# Patient Record
Sex: FEMALE | Race: ASIAN | NOT HISPANIC OR LATINO
[De-identification: names, ages, dates, MRNs, and addresses within clinical notes are randomized per-mention and may not be internally consistent; named-entity substitution may affect disease eponyms.]

---

## 2017-12-19 ENCOUNTER — APPOINTMENT (OUTPATIENT)
Dept: RADIOLOGY | Facility: HOSPITAL | Age: 42
End: 2017-12-19

## 2017-12-19 ENCOUNTER — OUTPATIENT (OUTPATIENT)
Dept: OUTPATIENT SERVICES | Facility: HOSPITAL | Age: 42
LOS: 1 days | End: 2017-12-19
Payer: COMMERCIAL

## 2017-12-19 DIAGNOSIS — M25.519 PAIN IN UNSPECIFIED SHOULDER: ICD-10-CM

## 2017-12-19 PROCEDURE — 73030 X-RAY EXAM OF SHOULDER: CPT | Mod: 26,RT

## 2019-03-27 ENCOUNTER — APPOINTMENT (OUTPATIENT)
Dept: MAMMOGRAPHY | Facility: IMAGING CENTER | Age: 44
End: 2019-03-27
Payer: COMMERCIAL

## 2019-03-27 ENCOUNTER — OUTPATIENT (OUTPATIENT)
Dept: OUTPATIENT SERVICES | Facility: HOSPITAL | Age: 44
LOS: 1 days | End: 2019-03-27
Payer: COMMERCIAL

## 2019-03-27 DIAGNOSIS — Z00.8 ENCOUNTER FOR OTHER GENERAL EXAMINATION: ICD-10-CM

## 2019-03-27 PROCEDURE — 77067 SCR MAMMO BI INCL CAD: CPT | Mod: 26

## 2019-03-27 PROCEDURE — 77063 BREAST TOMOSYNTHESIS BI: CPT | Mod: 26

## 2019-03-27 PROCEDURE — 77067 SCR MAMMO BI INCL CAD: CPT

## 2019-03-27 PROCEDURE — 77063 BREAST TOMOSYNTHESIS BI: CPT

## 2019-07-23 ENCOUNTER — APPOINTMENT (OUTPATIENT)
Dept: DERMATOLOGY | Facility: CLINIC | Age: 44
End: 2019-07-23
Payer: COMMERCIAL

## 2019-07-23 VITALS — SYSTOLIC BLOOD PRESSURE: 130 MMHG | DIASTOLIC BLOOD PRESSURE: 94 MMHG

## 2019-07-23 DIAGNOSIS — L81.0 POSTINFLAMMATORY HYPERPIGMENTATION: ICD-10-CM

## 2019-07-23 DIAGNOSIS — L56.4 POLYMORPHOUS LIGHT ERUPTION: ICD-10-CM

## 2019-07-23 PROCEDURE — 99203 OFFICE O/P NEW LOW 30 MIN: CPT

## 2019-07-23 RX ORDER — AZITHROMYCIN 500 MG/1
500 TABLET, FILM COATED ORAL
Qty: 3 | Refills: 0 | Status: ACTIVE | COMMUNITY
Start: 2019-02-05

## 2019-07-23 RX ORDER — LIDOCAINE 5% 700 MG/1
5 PATCH TOPICAL
Qty: 30 | Refills: 0 | Status: ACTIVE | COMMUNITY
Start: 2019-06-09

## 2019-07-23 RX ORDER — SIMVASTATIN 20 MG/1
20 TABLET, FILM COATED ORAL
Qty: 90 | Refills: 0 | Status: ACTIVE | COMMUNITY
Start: 2019-06-08

## 2019-07-23 RX ORDER — BETAMETHASONE DIPROPIONATE 0.5 MG/G
0.05 OINTMENT TOPICAL
Qty: 1 | Refills: 0 | Status: ACTIVE | COMMUNITY
Start: 2019-07-23 | End: 1900-01-01

## 2019-11-11 ENCOUNTER — INPATIENT (INPATIENT)
Facility: HOSPITAL | Age: 44
LOS: 6 days | Discharge: HOME | DRG: 854 | End: 2019-11-18
Attending: OBSTETRICS & GYNECOLOGY | Admitting: OBSTETRICS & GYNECOLOGY
Payer: COMMERCIAL

## 2019-11-11 VITALS
WEIGHT: 184.97 LBS | TEMPERATURE: 103 F | DIASTOLIC BLOOD PRESSURE: 78 MMHG | RESPIRATION RATE: 20 BRPM | SYSTOLIC BLOOD PRESSURE: 142 MMHG | HEIGHT: 61 IN | OXYGEN SATURATION: 100 % | HEART RATE: 120 BPM

## 2019-11-11 LAB
ALBUMIN SERPL ELPH-MCNC: 3.9 G/DL — SIGNIFICANT CHANGE UP (ref 3.3–5)
ALP SERPL-CCNC: 74 U/L — SIGNIFICANT CHANGE UP (ref 40–120)
ALT FLD-CCNC: 15 U/L — SIGNIFICANT CHANGE UP (ref 10–45)
ANION GAP SERPL CALC-SCNC: 13 MMOL/L — SIGNIFICANT CHANGE UP (ref 5–17)
APPEARANCE UR: CLEAR — SIGNIFICANT CHANGE UP
APTT BLD: 27.9 SEC — SIGNIFICANT CHANGE UP (ref 27.5–36.3)
AST SERPL-CCNC: 14 U/L — SIGNIFICANT CHANGE UP (ref 10–40)
BASOPHILS # BLD AUTO: 0 K/UL — SIGNIFICANT CHANGE UP (ref 0–0.2)
BASOPHILS NFR BLD AUTO: 0 % — SIGNIFICANT CHANGE UP (ref 0–2)
BILIRUB SERPL-MCNC: 0.2 MG/DL — SIGNIFICANT CHANGE UP (ref 0.2–1.2)
BILIRUB UR-MCNC: NEGATIVE — SIGNIFICANT CHANGE UP
BUN SERPL-MCNC: 13 MG/DL — SIGNIFICANT CHANGE UP (ref 7–23)
CALCIUM SERPL-MCNC: 9.5 MG/DL — SIGNIFICANT CHANGE UP (ref 8.4–10.5)
CHLORIDE SERPL-SCNC: 97 MMOL/L — SIGNIFICANT CHANGE UP (ref 96–108)
CO2 SERPL-SCNC: 23 MMOL/L — SIGNIFICANT CHANGE UP (ref 22–31)
COLOR SPEC: COLORLESS — SIGNIFICANT CHANGE UP
CREAT SERPL-MCNC: 0.61 MG/DL — SIGNIFICANT CHANGE UP (ref 0.5–1.3)
DIFF PNL FLD: NEGATIVE — SIGNIFICANT CHANGE UP
EOSINOPHIL # BLD AUTO: 0 K/UL — SIGNIFICANT CHANGE UP (ref 0–0.5)
EOSINOPHIL NFR BLD AUTO: 0 % — SIGNIFICANT CHANGE UP (ref 0–6)
GLUCOSE SERPL-MCNC: 120 MG/DL — HIGH (ref 70–99)
GLUCOSE UR QL: NEGATIVE — SIGNIFICANT CHANGE UP
HCG SERPL-ACNC: 0.5 MIU/ML — SIGNIFICANT CHANGE UP
HCT VFR BLD CALC: 30.5 % — LOW (ref 34.5–45)
HGB BLD-MCNC: 9.3 G/DL — LOW (ref 11.5–15.5)
INR BLD: 1.21 RATIO — HIGH (ref 0.88–1.16)
KETONES UR-MCNC: NEGATIVE — SIGNIFICANT CHANGE UP
LACTATE BLDV-MCNC: 1 MMOL/L — SIGNIFICANT CHANGE UP (ref 0.7–2)
LEUKOCYTE ESTERASE UR-ACNC: NEGATIVE — SIGNIFICANT CHANGE UP
LYMPHOCYTES # BLD AUTO: 14.9 % — SIGNIFICANT CHANGE UP (ref 13–44)
LYMPHOCYTES # BLD AUTO: 3.02 K/UL — SIGNIFICANT CHANGE UP (ref 1–3.3)
MCHC RBC-ENTMCNC: 20.9 PG — LOW (ref 27–34)
MCHC RBC-ENTMCNC: 30.5 GM/DL — LOW (ref 32–36)
MCV RBC AUTO: 68.5 FL — LOW (ref 80–100)
MONOCYTES # BLD AUTO: 0.89 K/UL — SIGNIFICANT CHANGE UP (ref 0–0.9)
MONOCYTES NFR BLD AUTO: 4.4 % — SIGNIFICANT CHANGE UP (ref 2–14)
NEUTROPHILS # BLD AUTO: 16.37 K/UL — HIGH (ref 1.8–7.4)
NEUTROPHILS NFR BLD AUTO: 80.7 % — HIGH (ref 43–77)
NITRITE UR-MCNC: NEGATIVE — SIGNIFICANT CHANGE UP
PH UR: 6.5 — SIGNIFICANT CHANGE UP (ref 5–8)
PLATELET # BLD AUTO: 383 K/UL — SIGNIFICANT CHANGE UP (ref 150–400)
POTASSIUM SERPL-MCNC: 4.1 MMOL/L — SIGNIFICANT CHANGE UP (ref 3.5–5.3)
POTASSIUM SERPL-SCNC: 4.1 MMOL/L — SIGNIFICANT CHANGE UP (ref 3.5–5.3)
PROT SERPL-MCNC: 7.9 G/DL — SIGNIFICANT CHANGE UP (ref 6–8.3)
PROT UR-MCNC: NEGATIVE — SIGNIFICANT CHANGE UP
PROTHROM AB SERPL-ACNC: 13.9 SEC — HIGH (ref 10–12.9)
RBC # BLD: 4.45 M/UL — SIGNIFICANT CHANGE UP (ref 3.8–5.2)
RBC # FLD: 16.4 % — HIGH (ref 10.3–14.5)
SODIUM SERPL-SCNC: 133 MMOL/L — LOW (ref 135–145)
SP GR SPEC: 1.01 — LOW (ref 1.01–1.02)
UROBILINOGEN FLD QL: NEGATIVE — SIGNIFICANT CHANGE UP
WBC # BLD: 20.29 K/UL — HIGH (ref 3.8–10.5)
WBC # FLD AUTO: 20.29 K/UL — HIGH (ref 3.8–10.5)

## 2019-11-11 PROCEDURE — 99285 EMERGENCY DEPT VISIT HI MDM: CPT

## 2019-11-11 PROCEDURE — 71045 X-RAY EXAM CHEST 1 VIEW: CPT | Mod: 26

## 2019-11-11 PROCEDURE — 93010 ELECTROCARDIOGRAM REPORT: CPT

## 2019-11-11 RX ORDER — MORPHINE SULFATE 50 MG/1
4 CAPSULE, EXTENDED RELEASE ORAL ONCE
Refills: 0 | Status: DISCONTINUED | OUTPATIENT
Start: 2019-11-11 | End: 2019-11-11

## 2019-11-11 RX ORDER — CEFOTETAN DISODIUM 1 G
2 VIAL (EA) INJECTION ONCE
Refills: 0 | Status: COMPLETED | OUTPATIENT
Start: 2019-11-11 | End: 2019-11-11

## 2019-11-11 RX ORDER — ONDANSETRON 8 MG/1
4 TABLET, FILM COATED ORAL ONCE
Refills: 0 | Status: COMPLETED | OUTPATIENT
Start: 2019-11-11 | End: 2019-11-11

## 2019-11-11 RX ORDER — ACETAMINOPHEN 500 MG
650 TABLET ORAL ONCE
Refills: 0 | Status: COMPLETED | OUTPATIENT
Start: 2019-11-11 | End: 2019-11-11

## 2019-11-11 RX ORDER — SODIUM CHLORIDE 9 MG/ML
2000 INJECTION, SOLUTION INTRAVENOUS ONCE
Refills: 0 | Status: COMPLETED | OUTPATIENT
Start: 2019-11-11 | End: 2019-11-11

## 2019-11-11 RX ADMIN — ONDANSETRON 4 MILLIGRAM(S): 8 TABLET, FILM COATED ORAL at 18:37

## 2019-11-11 RX ADMIN — Medication 650 MILLIGRAM(S): at 19:07

## 2019-11-11 RX ADMIN — MORPHINE SULFATE 4 MILLIGRAM(S): 50 CAPSULE, EXTENDED RELEASE ORAL at 20:19

## 2019-11-11 RX ADMIN — Medication 650 MILLIGRAM(S): at 18:37

## 2019-11-11 RX ADMIN — MORPHINE SULFATE 4 MILLIGRAM(S): 50 CAPSULE, EXTENDED RELEASE ORAL at 22:42

## 2019-11-11 RX ADMIN — MORPHINE SULFATE 4 MILLIGRAM(S): 50 CAPSULE, EXTENDED RELEASE ORAL at 19:49

## 2019-11-11 RX ADMIN — SODIUM CHLORIDE 2000 MILLILITER(S): 9 INJECTION, SOLUTION INTRAVENOUS at 19:35

## 2019-11-11 RX ADMIN — SODIUM CHLORIDE 2000 MILLILITER(S): 9 INJECTION, SOLUTION INTRAVENOUS at 18:37

## 2019-11-11 RX ADMIN — Medication 100 MILLIGRAM(S): at 20:50

## 2019-11-11 RX ADMIN — MORPHINE SULFATE 4 MILLIGRAM(S): 50 CAPSULE, EXTENDED RELEASE ORAL at 19:07

## 2019-11-11 RX ADMIN — Medication 110 MILLIGRAM(S): at 19:48

## 2019-11-11 RX ADMIN — Medication 100 GRAM(S): at 18:37

## 2019-11-11 RX ADMIN — MORPHINE SULFATE 4 MILLIGRAM(S): 50 CAPSULE, EXTENDED RELEASE ORAL at 18:37

## 2019-11-11 RX ADMIN — Medication 2 GRAM(S): at 19:30

## 2019-11-11 RX ADMIN — MORPHINE SULFATE 4 MILLIGRAM(S): 50 CAPSULE, EXTENDED RELEASE ORAL at 22:18

## 2019-11-11 NOTE — CONSULT NOTE ADULT - ASSESSMENT
ASSESSMENT: 45yo  LMP 10/25 presenting w/ fever/chills since Friday and abdominal pain. Previously diagnosed w/ conjunctivitis on Thursday. Saw GI MD had a CT that r/o appendicitis but demonstrated a R. ovarian mass. Patient is febrile to 103, Tachy to 120s, and has elevated WBC to 20.9. She was never treated w/ Abx for fever. She received Cefotetan/doxy in ED w/ IVF. TVUS demonstrates complex R ovarian mass suspicious for TOA.

## 2019-11-11 NOTE — ED ADULT NURSE REASSESSMENT NOTE - NS ED NURSE REASSESS COMMENT FT1
Pt states the morphine has helped, she has 8/10 now, denies pain medications at the moment. Pt calm and cooperative awaiting disposition.

## 2019-11-11 NOTE — ED PROVIDER NOTE - OBJECTIVE STATEMENT
43 yo F, PMHx of HLD on statin presents to ED with 5 days of fever and shaking chills. Developed RLQ pain. + yellow vaginal discharge. Feels nauseated. LNMP 1 week ago. CT as outpatient today showed normal appendix, no renal calculi but concerning R ovarian changes. Never had these sx in past. Monogamous with , last sexual activity 1 week ago. Denies hx of STD. No prior csections. Only  in past.

## 2019-11-11 NOTE — ED PROVIDER NOTE - ATTENDING CONTRIBUTION TO CARE
Amelia Becerra MD - Attending Physician: I have personally seen and examined this patient with the resident/fellow.  I have fully participated in the care of this patient. I have reviewed all pertinent clinical information, including history, physical exam, plan and the Resident/Fellow’s note and agree except as noted. See MDM

## 2019-11-11 NOTE — CONSULT NOTE ADULT - PROBLEM SELECTOR RECOMMENDATION 9
RECOMMENDATIONS:  -CBC, Lactate, CMP, Coags, T&S  -UA, UCx, BCx  -STI panel- Patient refuses all STI testing except GC/CT  -TVUS  -IVF 2 L Bolus w/ continuous 125ml/Hr  -Monitor vitals  -Tylenol/motrin for fever & pain  -Oxy for breakthrough pain  -Cefotetan 2g BID, Doxy 100 BID      Shay Vera PGY-2 d/w Dr. Ybarra

## 2019-11-11 NOTE — ED ADULT NURSE NOTE - OBJECTIVE STATEMENT
Patient is  45 y/o female c/o lower abdominal pain. States pain began a few days ago. Went to her doctor and CT was performed showing possible cysts on the ovaries. Told her to come to ED for IV abx. States she has had yellow vaginal discharge. Complains of nausea. Denies pain or burning on urination. Denies CP, SOB, V/D, Patient is  45 y/o female c/o lower abdominal pain. States pain began a few days ago. Went to her doctor and CT was performed showing possible cysts on the ovaries. Told her to come to ED for IV abx. States she has had yellow vaginal discharge. Complains of nausea. Denies pain or burning on urination. Denies CP, SOB, V/D, numbness, tingling, cough. A&Ox3. Breathing nonlabored and spontaneously. Abdomen soft, tender, nondistended. Denies increased pain upon palpation. No edema of extremities. Skin dry and intact. Safety and comfort measures provided. Family at bedside.

## 2019-11-11 NOTE — ED ADULT NURSE REASSESSMENT NOTE - NS ED NURSE REASSESS COMMENT FT1
Report received from Luz MOTA. Pt A&Ox3 calm and cooperative, Pt states she has 10/10 lower ABD that spreads to R flank.  Pt given morphine and Abx.  Denies chest pain, sob, ha, v/d, f/c, urinary symptoms, hematuria. Upon examination, full facial symmetry, no JVD or trach deviation, lung sounds clear and adequate chest rise, S1 and S2 heart sounds present, +2 pulses in all extremities with full ROM and equal strength, cap refill <2 seconds, ABD soft, non distended and non tender, ABD sounds present, pelvis intact.

## 2019-11-11 NOTE — ED ADULT NURSE NOTE - NSIMPLEMENTINTERV_GEN_ALL_ED
Implemented All Universal Safety Interventions:  [place] to call system. Call bell, personal items and telephone within reach. Instruct patient to call for assistance. Room bathroom lighting operational. Non-slip footwear when patient is off stretcher. Physically safe environment: no spills, clutter or unnecessary equipment. Stretcher in lowest position, wheels locked, appropriate side rails in place.

## 2019-11-11 NOTE — ED PROVIDER NOTE - PHYSICAL EXAMINATION
PHYSICAL EXAM:  GENERAL: moderate distress, looks ill, tachy, febrile   HEAD:  Atraumatic, Normocephalic  EYES: EOMI, PERRLA, conjunctiva and sclera clear  ENMT: No tonsillar erythema, exudates, or enlargement; Moist mucous membranes  NECK: Supple, No JVD  HEART: tachy, regular, no murmurs   RESPIRATORY: CTA B/L, No W/R/R  ABDOMEN: Soft, RLQ ttp, no distension, PELVIC: external exam normal, no significant adnexal ttp, mild CMT, copious purulent discharge, G/C sent, chaperone Dr. SALEEM Nelson   BACK: no cva or midline tenderness, normal ROM  NEURO: A&Ox3, nonfocal, moving all extremities  EXTREMITIES:  2+ Peripheral Pulses, No clubbing, cyanosis, or edema  SKIN: warm, dry, normal color, no rash PHYSICAL EXAM:  GENERAL: moderate distress, looks ill, tachy, febrile   HEAD:  Atraumatic, Normocephalic  EYES: EOMI, PERRLA, conjunctiva and sclera clear  ENMT: No tonsillar erythema, exudates, or enlargement; Moist mucous membranes  NECK: Supple, No JVD  HEART: tachy, regular, no murmurs   RESPIRATORY: CTA B/L, No W/R/R  ABDOMEN: Soft, RLQ ttp, no distension  PELVIC: external exam normal, right adnexal ttp, mild CMT, copious purulent discharge, G/C sent, chaperone Dr. SALEEM Nelson   BACK: no cva or midline tenderness, normal ROM  NEURO: A&Ox3, nonfocal, moving all extremities  EXTREMITIES:  2+ Peripheral Pulses, No clubbing, cyanosis, or edema  SKIN: warm, dry, normal color, no rash

## 2019-11-11 NOTE — ED PROVIDER NOTE - PROGRESS NOTE DETAILS
[facility] PGY2- GYN in ED to see pt, wbc 20k, BP remains wnl, pending TVUS, will require admission to GYN service, signed out to [facility] PGY3, pt offered syphilis/HIV testing, declined CANDI Chavez MD: Rec'd phonecall from radiology with US results, which show evidence of TOA, some complex calcifications to adnexa concerning for acute on chronic PID/TOA vs. superimposed tumor/mass which can better be evaluated via MRI. GYN consulted, will discuss.

## 2019-11-11 NOTE — ED PROVIDER NOTE - CLINICAL SUMMARY MEDICAL DECISION MAKING FREE TEXT BOX
[facility] PGY2- 5 days of fever, chills, RLQ pain, + yellow vag discharge, sexually active with , CT as outpatient revealed normal appendix, concern R ovarian changes  febrile, tachy, looks ill, RLQ tenderness, copious white discharge on pelvic exam, G/C sent  PID vs TOA vs endometritis, c/w pelvic pathology  TVUS, ivf, blood/urine/pelvic cx, doxy, cefotetan, labs, gyn consult [facility] PGY2- 5 days of fever, chills, RLQ pain, + yellow vag discharge, sexually active with , CT as outpatient revealed normal appendix, concern R ovarian changes  febrile, tachy, looks ill, RLQ tenderness, copious white discharge on pelvic exam, G/C sent  PID vs TOA vs endometritis, c/w pelvic pathology  TVUS, ivf, blood/urine/pelvic cx, doxy, cefotetan, labs, gyn consult    Amelia Becerra MD - Attending Physician: Pt here with RLQ pain, fevers. CT neg for appy/divertic. Exam most concerning for PID and possible TOA. Labs, US, abx, Gyn consult, pain control as needed

## 2019-11-12 DIAGNOSIS — N70.93 SALPINGITIS AND OOPHORITIS, UNSPECIFIED: ICD-10-CM

## 2019-11-12 DIAGNOSIS — N73.9 FEMALE PELVIC INFLAMMATORY DISEASE, UNSPECIFIED: ICD-10-CM

## 2019-11-12 LAB
ALBUMIN SERPL ELPH-MCNC: 3.1 G/DL — LOW (ref 3.3–5)
ALP SERPL-CCNC: 61 U/L — SIGNIFICANT CHANGE UP (ref 40–120)
ALT FLD-CCNC: 12 U/L — SIGNIFICANT CHANGE UP (ref 10–45)
ANION GAP SERPL CALC-SCNC: 15 MMOL/L — SIGNIFICANT CHANGE UP (ref 5–17)
AST SERPL-CCNC: 8 U/L — LOW (ref 10–40)
BASOPHILS # BLD AUTO: 0.04 K/UL — SIGNIFICANT CHANGE UP (ref 0–0.2)
BASOPHILS NFR BLD AUTO: 0.2 % — SIGNIFICANT CHANGE UP (ref 0–2)
BILIRUB SERPL-MCNC: 0.3 MG/DL — SIGNIFICANT CHANGE UP (ref 0.2–1.2)
BUN SERPL-MCNC: 9 MG/DL — SIGNIFICANT CHANGE UP (ref 7–23)
C TRACH RRNA SPEC QL NAA+PROBE: SIGNIFICANT CHANGE UP
CALCIUM SERPL-MCNC: 8.6 MG/DL — SIGNIFICANT CHANGE UP (ref 8.4–10.5)
CHLORIDE SERPL-SCNC: 100 MMOL/L — SIGNIFICANT CHANGE UP (ref 96–108)
CO2 SERPL-SCNC: 20 MMOL/L — LOW (ref 22–31)
CREAT SERPL-MCNC: 0.65 MG/DL — SIGNIFICANT CHANGE UP (ref 0.5–1.3)
CULTURE RESULTS: NO GROWTH — SIGNIFICANT CHANGE UP
EOSINOPHIL # BLD AUTO: 0.06 K/UL — SIGNIFICANT CHANGE UP (ref 0–0.5)
EOSINOPHIL NFR BLD AUTO: 0.3 % — SIGNIFICANT CHANGE UP (ref 0–6)
GLUCOSE SERPL-MCNC: 125 MG/DL — HIGH (ref 70–99)
HCT VFR BLD CALC: 27.7 % — LOW (ref 34.5–45)
HGB BLD-MCNC: 8.5 G/DL — LOW (ref 11.5–15.5)
IMM GRANULOCYTES NFR BLD AUTO: 1.3 % — SIGNIFICANT CHANGE UP (ref 0–1.5)
LYMPHOCYTES # BLD AUTO: 11.7 % — LOW (ref 13–44)
LYMPHOCYTES # BLD AUTO: 2.58 K/UL — SIGNIFICANT CHANGE UP (ref 1–3.3)
MCHC RBC-ENTMCNC: 20.9 PG — LOW (ref 27–34)
MCHC RBC-ENTMCNC: 30.7 GM/DL — LOW (ref 32–36)
MCV RBC AUTO: 68.2 FL — LOW (ref 80–100)
MONOCYTES # BLD AUTO: 1.19 K/UL — HIGH (ref 0–0.9)
MONOCYTES NFR BLD AUTO: 5.4 % — SIGNIFICANT CHANGE UP (ref 2–14)
N GONORRHOEA RRNA SPEC QL NAA+PROBE: SIGNIFICANT CHANGE UP
NEUTROPHILS # BLD AUTO: 17.8 K/UL — HIGH (ref 1.8–7.4)
NEUTROPHILS NFR BLD AUTO: 81.1 % — HIGH (ref 43–77)
NRBC # BLD: 0 /100 WBCS — SIGNIFICANT CHANGE UP (ref 0–0)
PLATELET # BLD AUTO: 337 K/UL — SIGNIFICANT CHANGE UP (ref 150–400)
POTASSIUM SERPL-MCNC: 3.9 MMOL/L — SIGNIFICANT CHANGE UP (ref 3.5–5.3)
POTASSIUM SERPL-SCNC: 3.9 MMOL/L — SIGNIFICANT CHANGE UP (ref 3.5–5.3)
PROT SERPL-MCNC: 6.2 G/DL — SIGNIFICANT CHANGE UP (ref 6–8.3)
RBC # BLD: 4.06 M/UL — SIGNIFICANT CHANGE UP (ref 3.8–5.2)
RBC # FLD: 16.4 % — HIGH (ref 10.3–14.5)
SODIUM SERPL-SCNC: 135 MMOL/L — SIGNIFICANT CHANGE UP (ref 135–145)
SPECIMEN SOURCE: SIGNIFICANT CHANGE UP
SPECIMEN SOURCE: SIGNIFICANT CHANGE UP
WBC # BLD: 21.96 K/UL — HIGH (ref 3.8–10.5)
WBC # FLD AUTO: 21.96 K/UL — HIGH (ref 3.8–10.5)

## 2019-11-12 PROCEDURE — 99232 SBSQ HOSP IP/OBS MODERATE 35: CPT

## 2019-11-12 PROCEDURE — 93975 VASCULAR STUDY: CPT | Mod: 26

## 2019-11-12 PROCEDURE — 76830 TRANSVAGINAL US NON-OB: CPT | Mod: 26

## 2019-11-12 RX ORDER — CEFOTETAN DISODIUM 1 G
2 VIAL (EA) INJECTION EVERY 12 HOURS
Refills: 0 | Status: DISCONTINUED | OUTPATIENT
Start: 2019-11-12 | End: 2019-11-15

## 2019-11-12 RX ORDER — OXYCODONE HYDROCHLORIDE 5 MG/1
5 TABLET ORAL EVERY 6 HOURS
Refills: 0 | Status: DISCONTINUED | OUTPATIENT
Start: 2019-11-12 | End: 2019-11-13

## 2019-11-12 RX ORDER — CEFOTETAN DISODIUM 1 G
VIAL (EA) INJECTION
Refills: 0 | Status: DISCONTINUED | OUTPATIENT
Start: 2019-11-12 | End: 2019-11-15

## 2019-11-12 RX ORDER — IBUPROFEN 200 MG
600 TABLET ORAL EVERY 6 HOURS
Refills: 0 | Status: DISCONTINUED | OUTPATIENT
Start: 2019-11-12 | End: 2019-11-15

## 2019-11-12 RX ORDER — HEPARIN SODIUM 5000 [USP'U]/ML
5000 INJECTION INTRAVENOUS; SUBCUTANEOUS EVERY 12 HOURS
Refills: 0 | Status: DISCONTINUED | OUTPATIENT
Start: 2019-11-12 | End: 2019-11-15

## 2019-11-12 RX ORDER — METRONIDAZOLE 500 MG
500 TABLET ORAL ONCE
Refills: 0 | Status: COMPLETED | OUTPATIENT
Start: 2019-11-12 | End: 2019-11-12

## 2019-11-12 RX ORDER — SODIUM CHLORIDE 9 MG/ML
1000 INJECTION, SOLUTION INTRAVENOUS
Refills: 0 | Status: DISCONTINUED | OUTPATIENT
Start: 2019-11-12 | End: 2019-11-12

## 2019-11-12 RX ORDER — METRONIDAZOLE 500 MG
500 TABLET ORAL EVERY 8 HOURS
Refills: 0 | Status: DISCONTINUED | OUTPATIENT
Start: 2019-11-12 | End: 2019-11-13

## 2019-11-12 RX ORDER — CEFOTETAN DISODIUM 1 G
2 VIAL (EA) INJECTION ONCE
Refills: 0 | Status: COMPLETED | OUTPATIENT
Start: 2019-11-12 | End: 2019-11-12

## 2019-11-12 RX ORDER — ACETAMINOPHEN 500 MG
975 TABLET ORAL EVERY 6 HOURS
Refills: 0 | Status: DISCONTINUED | OUTPATIENT
Start: 2019-11-12 | End: 2019-11-15

## 2019-11-12 RX ORDER — MORPHINE SULFATE 50 MG/1
4 CAPSULE, EXTENDED RELEASE ORAL ONCE
Refills: 0 | Status: DISCONTINUED | OUTPATIENT
Start: 2019-11-12 | End: 2019-11-12

## 2019-11-12 RX ORDER — METRONIDAZOLE 500 MG
TABLET ORAL
Refills: 0 | Status: DISCONTINUED | OUTPATIENT
Start: 2019-11-12 | End: 2019-11-13

## 2019-11-12 RX ORDER — ACETAMINOPHEN 500 MG
975 TABLET ORAL ONCE
Refills: 0 | Status: COMPLETED | OUTPATIENT
Start: 2019-11-12 | End: 2019-11-12

## 2019-11-12 RX ADMIN — Medication 600 MILLIGRAM(S): at 05:15

## 2019-11-12 RX ADMIN — Medication 600 MILLIGRAM(S): at 13:34

## 2019-11-12 RX ADMIN — Medication 600 MILLIGRAM(S): at 13:03

## 2019-11-12 RX ADMIN — Medication 100 GRAM(S): at 17:13

## 2019-11-12 RX ADMIN — Medication 600 MILLIGRAM(S): at 17:43

## 2019-11-12 RX ADMIN — Medication 100 GRAM(S): at 05:15

## 2019-11-12 RX ADMIN — Medication 110 MILLIGRAM(S): at 05:15

## 2019-11-12 RX ADMIN — SODIUM CHLORIDE 125 MILLILITER(S): 9 INJECTION, SOLUTION INTRAVENOUS at 03:48

## 2019-11-12 RX ADMIN — Medication 100 MILLIGRAM(S): at 22:19

## 2019-11-12 RX ADMIN — Medication 600 MILLIGRAM(S): at 17:13

## 2019-11-12 RX ADMIN — Medication 975 MILLIGRAM(S): at 20:16

## 2019-11-12 RX ADMIN — Medication 975 MILLIGRAM(S): at 20:50

## 2019-11-12 RX ADMIN — Medication 600 MILLIGRAM(S): at 05:50

## 2019-11-12 RX ADMIN — MORPHINE SULFATE 4 MILLIGRAM(S): 50 CAPSULE, EXTENDED RELEASE ORAL at 03:11

## 2019-11-12 RX ADMIN — Medication 110 MILLIGRAM(S): at 17:12

## 2019-11-12 RX ADMIN — Medication 975 MILLIGRAM(S): at 02:36

## 2019-11-12 RX ADMIN — Medication 975 MILLIGRAM(S): at 07:37

## 2019-11-12 RX ADMIN — Medication 975 MILLIGRAM(S): at 03:11

## 2019-11-12 RX ADMIN — HEPARIN SODIUM 5000 UNIT(S): 5000 INJECTION INTRAVENOUS; SUBCUTANEOUS at 17:13

## 2019-11-12 RX ADMIN — MORPHINE SULFATE 4 MILLIGRAM(S): 50 CAPSULE, EXTENDED RELEASE ORAL at 02:36

## 2019-11-12 RX ADMIN — Medication 100 MILLIGRAM(S): at 13:03

## 2019-11-12 RX ADMIN — Medication 975 MILLIGRAM(S): at 13:04

## 2019-11-12 RX ADMIN — Medication 975 MILLIGRAM(S): at 13:34

## 2019-11-12 RX ADMIN — Medication 100 MILLIGRAM(S): at 07:37

## 2019-11-12 NOTE — PROGRESS NOTE ADULT - ASSESSMENT
44yoF admitted for TOA with fever/chills and RLQ abdominal pain since Friday and with CT scan from outside institution (11/11) and TVUS done here (11/12) demonstrating a complex right ovarian mass.  Patient was febrile to 103, tachy to 120s and WBC 22 on admission, and currently is afebrile and tachy to 108 on Cefotetan and Doxy.  Pain currently well controlled.

## 2019-11-12 NOTE — H&P ADULT - ASSESSMENT
ASSESSMENT: 45yo  LMP 10/25 presenting w/ fever/chills since Friday and abdominal pain. Previously diagnosed w/ conjunctivitis on Thursday. Saw GI MD had a CT that r/o appendicitis but demonstrated a R. ovarian mass. Patient is febrile to 103, Tachy to 120s, and has elevated WBC to 20.9. She was never treated w/ Abx for fever. She received Cefotetan/doxy in ED w/ IVF. TVUS demonstrates complex R. ovarian mass suspicious for TOA.

## 2019-11-12 NOTE — ED ADULT NURSE REASSESSMENT NOTE - NS ED NURSE REASSESS COMMENT FT1
Pt states pain has returned. Pt given morphine and Tylenol for pain.  Pt resting and calm awaiting bed placement.

## 2019-11-12 NOTE — PROGRESS NOTE ADULT - PROBLEM SELECTOR PLAN 1
Neuro: PO tylenol, motrin, oxy for pain  CV: improving tachycardia, IVF and continue to monitor vitals  Pulm: satting well on room air, continue to ambulate  GI: Reg diet  : voiding spontaneously   Heme: adding Heparin this morning, f/u CBC/BMP/Lactate  ID: currently afebrile, c/w Cefotetan and Doxy, add Flagyl, f/u BCx, UCx, GC/CT  FEN: LR@125   Imaging: f/u CT images from [facility], possible MRI w/ IV contrast today    Jose Bee, PGY1 Neuro: PO tylenol, motrin, oxy for pain  CV: improving tachycardia, IVF and continue to monitor vitals  Pulm: saturating well on room air, continue to ambulate  GI: Reg diet  : voiding spontaneously   Heme: adding Heparin prophylaxis this morning, f/u CBC/BMP/Lactate  ID: currently afebrile, c/w Cefotetan and Doxy, add Flagyl, f/u BCx, UCx, GC/CT  FEN: LR@125   Imaging: f/u CT images from [facility], possible MRI w/ IV contrast today    Jose Bee, PGY1

## 2019-11-12 NOTE — H&P ADULT - HISTORY OF PRESENT ILLNESS
43yo  LMP 10/25 presenting w/ fever/chills since Friday and abdominal pain. Patient said that she started feeling under the weather on Thursday and went to urgent care. Got flu swab that was negative and labs drawn. At Urgent care center was diagnosed w/ conjunctivitis and likely viral infection. Also had GBS in urine. Patient took temperature on Friday and was 102-103F. At this time, she also had a vaginal yellow discharge. She followed up with GI who charla labs. Found that she had a WBC of 18.5 and went to urgent are today for evaluation. Patient said that she had increased abdominal pain today which is why she went. She had a CT that prelim read said no appendicitis/kidney stones but something concerning on R. ovary. Upon arrival to ED patient had continued RLQ pain radiating to the flank. She tried tylenol/motrin for pain that provided minimal improvement. Patient said it feels like a constant pressure. She was febrile to 102 and was started on Cefotetan/doxy and given IVF. she was tachy to 120s. Other than abdominal pain/vaginal discharge she is asymptomatic. Patient is sexually active with her . has never had history of STI and never sexually active with another partner.    WBC 20.29. Lactate 1.0. CXR Clear.     Pt denies nausea, vomiting, diarrhea, headache, constipation, dizzyness, syncope, chest pain, palpitations, shortness of breath, dysuria, urgency, frequency, vaginal bleeding/pain/itching, breast lumps/bumps, dyspareunia.      Primary OB/GYN: Dr. Burns  OBH:2x , 2x SAB  GYNH: denies hx of fibroids, ov cysts, abnl paps, sti  PMSH: HLD  MEDS: Simvastatin  ALL: nkda  SOCH: denies t/e/d; safe at home  FAMH: denies fam hx of breast/uterine/ovarian/colon cancer

## 2019-11-12 NOTE — ED ADULT NURSE REASSESSMENT NOTE - NS ED NURSE REASSESS COMMENT FT1
Pt 90-92% RA when sleeping, Pt put on 2 L NC.  Pt Temp down to 99.5 F.  Pt calm and resting awaiting bed placement.

## 2019-11-12 NOTE — H&P ADULT - PROBLEM SELECTOR PLAN 1
RECOMMENDATIONS:  -CBC, Lactate, CMP, Coags, T&S, UA  -BCx, UCx  -STI Panel - Patient refused all except GC/CT  -TVUS  -IVF 2 L Bolus w/ continuous 125ml/Hr  -Monitor vitals  -Tylenol/motrin for fever & pain  -Oxycodone for breakthrough  -Cefotetan 2g Q12h, Doxy 100 Q12hr    Shay Vera PGY-2 d/w Dr. Ybarra

## 2019-11-12 NOTE — H&P ADULT - NSHPLABSRESULTS_GEN_ALL_CORE
RADIOLOGY:  CT Scan @ [facility] (11/11): R. ovary asymetrically enlarged measuring 6.6x5.2cm w/ focal calcification and low attenuation. Differential was TOA, R. ovarian mass, Torsion. Normal appendix.    INTERPRETATION:  CLINICAL INFORMATION: Fever and abdominal pain. CT at   urgent care center with asymmetrically enlarged right ovary containing   calcification. Concern for mass or tubo-ovarian abscess.    LMP: 10/25/2019    Beta-hCG was less than 0.5    COMPARISON: None available.    TECHNIQUE:     Endovaginal pelvic sonogram as per order. Transabdominal pelvic sonogram   was also performed as part of our standard protocol. Color and Spectral   Doppler was performed.    FINDINGS:    Uterus: 9.7 x 5.2 x 5.4 cm. Within normal limits. There is a 1.5 x 1.5 x   1.3 cm right fundal myometrial mass which is likely an intramural fibroid.    Endometrium: 7 mm. Within normal limits.    Right ovary: Complex right adnexal mass containing soft tissue and fluid   echogenicity overall measuring approximately 5.2 x 4.7 x 5.5 cm. A   component measuring 4.7 x 4 x 3.2 cm resembles an abnormal appearing   ovary with normal arterial and venous waveforms. There are thick-walled,   tubular structures with complex internal echoes, echogenic foci with   posterior acoustic shadowing, and peripheral vascularity, which may   reflect dilated, hyperemic fallopian tubes containing complex fluid and   calcifications.    Left ovary: 2.3 x 2.2 x 2.3 cm. Within normal limits. Normal arterial and   venous waveforms.    Fluid: Small free fluid is present within the right adnexa.    IMPRESSION:    Complex right adnexal mass with suspicion of adjacent dilated, hyperemic   and thickened right fallopian tube. Contrast-enhanced pelvic MRI   recommended for further characterization, if there are no   contraindications.

## 2019-11-12 NOTE — PROGRESS NOTE ADULT - SUBJECTIVE AND OBJECTIVE BOX
R1 GYN Note HD#1    Patient seen and examined at bedside, no acute events since admission.  Patient says pain is currently resolved close to 0/10 after taking motrin.  She is eating without nausea or vomiting, voiding spontaneously and ambulating without difficulty.  No bowel movement since yesterday prior to admission.  She denies chest pain, shortness of breath, fever, chills, headache, pain in extremities.    Vital Signs Last 24 Hours  T(C): 36.9 (11-12-19 @ 04:41), Max: 39.4 (11-12-19 @ 01:30)  HR: 108 (11-12-19 @ 04:41) (108 - 124)  BP: 112/77 (11-12-19 @ 04:41) (103/60 - 142/78)  RR: 18 (11-12-19 @ 04:41) (18 - 20)  SpO2: 100% (11-12-19 @ 04:41) (94% - 100%)    I&O's Summary    11 Nov 2019 07:01  -  12 Nov 2019 06:46  --------------------------------------------------------  IN: 125 mL / OUT: 0 mL / NET: 125 mL        Physical Exam:  General: NAD  CV: RRR, S1 and S2  Lungs: CTA-B  Abdomen: Soft, non distended, mild tenderness to deep palpation in RLQ, nontender to palpation in LLQ, LUQ, RUQ, BS+  Ext: No pain or swelling    Labs:             8.5<L>  21.96<H> )-----------( 337      ( 11-12 @ 05:56 )             27.7<L>               9.3<L>  20.29<H> )-----------( 383      ( 11-11 @ 18:30 )             30.5<L>        MEDICATIONS  (STANDING):  acetaminophen   Tablet .. 975 milliGRAM(s) Oral every 6 hours  cefoTEtan  IVPB      cefoTEtan  IVPB 2 Gram(s) IV Intermittent every 12 hours  doxycycline IVPB 100 milliGRAM(s) IV Intermittent every 12 hours  heparin  Injectable 5000 Unit(s) SubCutaneous every 12 hours  ibuprofen  Tablet. 600 milliGRAM(s) Oral every 6 hours  lactated ringers. 1000 milliLiter(s) (125 mL/Hr) IV Continuous <Continuous>    MEDICATIONS  (PRN):  oxyCODONE    IR 5 milliGRAM(s) Oral every 6 hours PRN Severe Pain (7 - 10)

## 2019-11-12 NOTE — H&P ADULT - NSHPPHYSICALEXAM_GEN_ALL_CORE
Vital Signs Last 24 Hrs  T(C): 39.4 (12 Nov 2019 01:30), Max: 39.4 (12 Nov 2019 01:30)  T(F): 103 (12 Nov 2019 01:30), Max: 103 (12 Nov 2019 01:30)  HR: 124 (12 Nov 2019 02:30) (109 - 124)  BP: 105/72 (12 Nov 2019 02:30) (103/70 - 142/78)  BP(mean): --  RR: 20 (12 Nov 2019 02:30) (18 - 20)  SpO2: 94% (12 Nov 2019 02:30) (94% - 100%)    PHYSICAL EXAM:  GEN: NAD, A&Ox3  ABD: soft, ND, mild RLQ tenderness, No guarding/rebound  SPECULUM: Green/yellow discharge from the cervical os, cervix closed  PELVIC: Mild CMT  EXT: WWP, no edema/TTP

## 2019-11-13 LAB
ANION GAP SERPL CALC-SCNC: 10 MMOL/L — SIGNIFICANT CHANGE UP (ref 5–17)
BUN SERPL-MCNC: 9 MG/DL — SIGNIFICANT CHANGE UP (ref 7–23)
CALCIUM SERPL-MCNC: 9.3 MG/DL — SIGNIFICANT CHANGE UP (ref 8.4–10.5)
CHLORIDE SERPL-SCNC: 102 MMOL/L — SIGNIFICANT CHANGE UP (ref 96–108)
CO2 SERPL-SCNC: 27 MMOL/L — SIGNIFICANT CHANGE UP (ref 22–31)
CREAT SERPL-MCNC: 0.56 MG/DL — SIGNIFICANT CHANGE UP (ref 0.5–1.3)
GLUCOSE SERPL-MCNC: 124 MG/DL — HIGH (ref 70–99)
HCT VFR BLD CALC: 27.8 % — LOW (ref 34.5–45)
HGB BLD-MCNC: 8.6 G/DL — LOW (ref 11.5–15.5)
HIV 1+2 AB+HIV1 P24 AG SERPL QL IA: SIGNIFICANT CHANGE UP
MCHC RBC-ENTMCNC: 21 PG — LOW (ref 27–34)
MCHC RBC-ENTMCNC: 30.9 GM/DL — LOW (ref 32–36)
MCV RBC AUTO: 68 FL — LOW (ref 80–100)
NRBC # BLD: 0 /100 WBCS — SIGNIFICANT CHANGE UP (ref 0–0)
PLATELET # BLD AUTO: 361 K/UL — SIGNIFICANT CHANGE UP (ref 150–400)
POTASSIUM SERPL-MCNC: 3.7 MMOL/L — SIGNIFICANT CHANGE UP (ref 3.5–5.3)
POTASSIUM SERPL-SCNC: 3.7 MMOL/L — SIGNIFICANT CHANGE UP (ref 3.5–5.3)
RBC # BLD: 4.09 M/UL — SIGNIFICANT CHANGE UP (ref 3.8–5.2)
RBC # FLD: 16.4 % — HIGH (ref 10.3–14.5)
SODIUM SERPL-SCNC: 139 MMOL/L — SIGNIFICANT CHANGE UP (ref 135–145)
T PALLIDUM AB TITR SER: NEGATIVE — SIGNIFICANT CHANGE UP
WBC # BLD: 24.02 K/UL — HIGH (ref 3.8–10.5)
WBC # FLD AUTO: 24.02 K/UL — HIGH (ref 3.8–10.5)

## 2019-11-13 PROCEDURE — 74177 CT ABD & PELVIS W/CONTRAST: CPT | Mod: 26

## 2019-11-13 PROCEDURE — 99255 IP/OBS CONSLTJ NEW/EST HI 80: CPT

## 2019-11-13 PROCEDURE — 99232 SBSQ HOSP IP/OBS MODERATE 35: CPT

## 2019-11-13 RX ORDER — SODIUM CHLORIDE 9 MG/ML
1000 INJECTION, SOLUTION INTRAVENOUS
Refills: 0 | Status: DISCONTINUED | OUTPATIENT
Start: 2019-11-13 | End: 2019-11-15

## 2019-11-13 RX ORDER — ONDANSETRON 8 MG/1
4 TABLET, FILM COATED ORAL EVERY 6 HOURS
Refills: 0 | Status: DISCONTINUED | OUTPATIENT
Start: 2019-11-13 | End: 2019-11-15

## 2019-11-13 RX ORDER — HYDROMORPHONE HYDROCHLORIDE 2 MG/ML
2 INJECTION INTRAMUSCULAR; INTRAVENOUS; SUBCUTANEOUS EVERY 6 HOURS
Refills: 0 | Status: DISCONTINUED | OUTPATIENT
Start: 2019-11-13 | End: 2019-11-13

## 2019-11-13 RX ORDER — METRONIDAZOLE 500 MG
500 TABLET ORAL EVERY 12 HOURS
Refills: 0 | Status: DISCONTINUED | OUTPATIENT
Start: 2019-11-13 | End: 2019-11-15

## 2019-11-13 RX ORDER — HYDROMORPHONE HYDROCHLORIDE 2 MG/ML
2 INJECTION INTRAMUSCULAR; INTRAVENOUS; SUBCUTANEOUS EVERY 4 HOURS
Refills: 0 | Status: DISCONTINUED | OUTPATIENT
Start: 2019-11-13 | End: 2019-11-15

## 2019-11-13 RX ORDER — ONDANSETRON 8 MG/1
4 TABLET, FILM COATED ORAL ONCE
Refills: 0 | Status: COMPLETED | OUTPATIENT
Start: 2019-11-13 | End: 2019-11-13

## 2019-11-13 RX ADMIN — Medication 600 MILLIGRAM(S): at 17:58

## 2019-11-13 RX ADMIN — Medication 600 MILLIGRAM(S): at 06:10

## 2019-11-13 RX ADMIN — SODIUM CHLORIDE 125 MILLILITER(S): 9 INJECTION, SOLUTION INTRAVENOUS at 16:17

## 2019-11-13 RX ADMIN — Medication 975 MILLIGRAM(S): at 07:52

## 2019-11-13 RX ADMIN — Medication 100 GRAM(S): at 06:10

## 2019-11-13 RX ADMIN — Medication 100 MILLIGRAM(S): at 21:02

## 2019-11-13 RX ADMIN — Medication 975 MILLIGRAM(S): at 08:22

## 2019-11-13 RX ADMIN — Medication 600 MILLIGRAM(S): at 23:58

## 2019-11-13 RX ADMIN — ONDANSETRON 4 MILLIGRAM(S): 8 TABLET, FILM COATED ORAL at 03:00

## 2019-11-13 RX ADMIN — Medication 110 MILLIGRAM(S): at 06:11

## 2019-11-13 RX ADMIN — HYDROMORPHONE HYDROCHLORIDE 2 MILLIGRAM(S): 2 INJECTION INTRAMUSCULAR; INTRAVENOUS; SUBCUTANEOUS at 16:08

## 2019-11-13 RX ADMIN — Medication 975 MILLIGRAM(S): at 13:06

## 2019-11-13 RX ADMIN — Medication 100 MILLIGRAM(S): at 07:51

## 2019-11-13 RX ADMIN — HYDROMORPHONE HYDROCHLORIDE 2 MILLIGRAM(S): 2 INJECTION INTRAMUSCULAR; INTRAVENOUS; SUBCUTANEOUS at 21:34

## 2019-11-13 RX ADMIN — Medication 600 MILLIGRAM(S): at 23:28

## 2019-11-13 RX ADMIN — Medication 600 MILLIGRAM(S): at 06:50

## 2019-11-13 RX ADMIN — HEPARIN SODIUM 5000 UNIT(S): 5000 INJECTION INTRAVENOUS; SUBCUTANEOUS at 18:58

## 2019-11-13 RX ADMIN — Medication 100 GRAM(S): at 17:29

## 2019-11-13 RX ADMIN — Medication 975 MILLIGRAM(S): at 21:34

## 2019-11-13 RX ADMIN — Medication 975 MILLIGRAM(S): at 21:04

## 2019-11-13 RX ADMIN — HYDROMORPHONE HYDROCHLORIDE 2 MILLIGRAM(S): 2 INJECTION INTRAMUSCULAR; INTRAVENOUS; SUBCUTANEOUS at 21:04

## 2019-11-13 RX ADMIN — Medication 110 MILLIGRAM(S): at 17:29

## 2019-11-13 RX ADMIN — Medication 600 MILLIGRAM(S): at 17:28

## 2019-11-13 RX ADMIN — Medication 600 MILLIGRAM(S): at 11:50

## 2019-11-13 RX ADMIN — Medication 975 MILLIGRAM(S): at 02:55

## 2019-11-13 RX ADMIN — Medication 975 MILLIGRAM(S): at 01:59

## 2019-11-13 RX ADMIN — HYDROMORPHONE HYDROCHLORIDE 2 MILLIGRAM(S): 2 INJECTION INTRAMUSCULAR; INTRAVENOUS; SUBCUTANEOUS at 15:36

## 2019-11-13 RX ADMIN — HEPARIN SODIUM 5000 UNIT(S): 5000 INJECTION INTRAVENOUS; SUBCUTANEOUS at 06:10

## 2019-11-13 RX ADMIN — Medication 600 MILLIGRAM(S): at 12:20

## 2019-11-13 NOTE — PROGRESS NOTE ADULT - SUBJECTIVE AND OBJECTIVE BOX
R1 GYN Progress Note   HD#2    Patient seen and examined at bedside.  Pain worse last night and continues to be 8/10 this morning.  Says abdominal pain and pressure is worse when bladder is full.  She is taking Motrin for pain as Oxy only irritated her GI.  One episode of N/V last night better with Zofran  She is ambulating and tolerating regular diet.  Passing flatus and voiding spontaneously  Denies CP, SOB, fevers, and chills.    Vital Signs Last 24 Hours  T(C): 37.1 (11-13-19 @ 05:58), Max: 37.1 (11-13-19 @ 05:58)  HR: 86 (11-13-19 @ 05:58) (84 - 92)  BP: 124/82 (11-13-19 @ 05:58) (94/67 - 124/82)  RR: 16 (11-13-19 @ 05:58) (16 - 18)  SpO2: 93% (11-13-19 @ 05:58) (93% - 100%)    I&O's Summary    11 Nov 2019 07:01  -  12 Nov 2019 07:00  --------------------------------------------------------  IN: 250 mL / OUT: 0 mL / NET: 250 mL    12 Nov 2019 07:01  -  13 Nov 2019 06:51  --------------------------------------------------------  IN: 2640 mL / OUT: 0 mL / NET: 2640 mL        Physical Exam:  General: NAD  CV: RRR  Lungs: CTAB  Abdomen: soft, non distended, moderately tender in RLQ, non tender in LLQ/LUQ/RUQ normoactive bowel sounds  Ext: no pain or swelling     Labs:                        8.5    21.96 )-----------( 337      ( 12 Nov 2019 05:56 )             27.7   baso 0.2    eos 0.3    imm gran 1.3    lymph 11.7   mono 5.4    poly 81.1                         9.3    20.29 )-----------( 383      ( 11 Nov 2019 18:30 )             30.5   baso 0.0    eos 0.0    imm gran x      lymph 14.9   mono 4.4    poly 80.7       MEDICATIONS  (STANDING):  acetaminophen   Tablet .. 975 milliGRAM(s) Oral every 6 hours  cefoTEtan  IVPB      cefoTEtan  IVPB 2 Gram(s) IV Intermittent every 12 hours  doxycycline IVPB 100 milliGRAM(s) IV Intermittent every 12 hours  heparin  Injectable 5000 Unit(s) SubCutaneous every 12 hours  ibuprofen  Tablet. 600 milliGRAM(s) Oral every 6 hours  metroNIDAZOLE  IVPB      metroNIDAZOLE  IVPB 500 milliGRAM(s) IV Intermittent every 8 hours    MEDICATIONS  (PRN):  oxyCODONE    IR 5 milliGRAM(s) Oral every 6 hours PRN Severe Pain (7 - 10)      Neuro: PO pain meds.   CV: Hemodynamically stable  Pulm: Saturating well on room air, encourage oob/amb  GI: Advance to regular diet vs. Continue regular diet  : UOP adequate, d/c santizo   vs. Voiding spontanously   Heme: c/w HSQ and SCDs for DVT ppx  FEN: LR@125.  replete electrolytes prn   ID: Afebrile  Endo: No active issues   Dispo: Continue routine post-op care

## 2019-11-13 NOTE — CONSULT NOTE ADULT - SUBJECTIVE AND OBJECTIVE BOX
"HPI: 43yo  LMP 10/25 presenting w/ fever/chills since Friday and abdominal pain. Patient said that she started feeling under the weather on Thursday and went to urgent care. Got flu swab that was negative and labs drawn. At Urgent care center was diagnosed w/ conjunctivitis and likely viral infection. Also had GBS in urine. Patient took temperature on Friday and was 102-103F. At this time, she also had a vaginal yellow discharge. She followed up with GI who charla labs. Found that she had a WBC of 18.5 and went to urgent are today for evaluation. Patient said that she had increased abdominal pain today which is why she went. She had a CT that prelim read said no appendicitis/kidney stones but something concerning on R. ovary. Upon arrival to ED patient had continued RLQ pain radiating to the flank. She tried tylenol/motrin for pain that provided minimal improvement. Patient said it feels like a constant pressure. She was febrile to 102 and was started on Cefotetan/doxy and given IVF. she was tachy to 120s. Other than abdominal pain/vaginal discharge she is asymptomatic. Patient is sexually active with her . has never had history of STI and never sexually active with another partner.    WBC 20.29. Lactate 1.0. CXR Clear.     Pt denies nausea, vomiting, diarrhea, headache, constipation, dizzyness, syncope, chest pain, palpitations, shortness of breath, dysuria, urgency, frequency, vaginal bleeding/pain/itching, breast lumps/bumps, dyspareunia.    Primary OB/GYN: Dr. Burns  OBH:2x , 2x SAB  GYNH: denies hx of fibroids, ov cysts, abnl paps, sti  PMSH: HLD  MEDS: Simvastatin  ALL: nkda  SOCH: denies t/e/d; safe at home  FAMH: denies fam hx of breast/uterine/ovarian/colon cancer (2019 02:44)"    Above reviewed. Saw/spoke to patient. Initially with fever. Complains of RLQ abd pain. Intermittent, waxing and waning. Patient has had pain since 5 days prior to presentation. Never had these symptoms before. Had CT with ? R ovary lesion. Now presents to ED for further eval. Today, no further fevers. Has N/V. Has abd pain. Had diarrhea since starting antibiotics. ID called for further eval.    PAST MEDICAL & SURGICAL HISTORY:  HLD (hyperlipidemia)  No significant past surgical history    Allergies    No Known Allergies    ANTIMICROBIALS:  cefoTEtan  IVPB    cefoTEtan  IVPB 2 every 12 hours  doxycycline IVPB 100 every 12 hours  metroNIDAZOLE  IVPB    metroNIDAZOLE  IVPB 500 every 8 hours    OTHER MEDS:  acetaminophen   Tablet .. 975 milliGRAM(s) Oral every 6 hours  heparin  Injectable 5000 Unit(s) SubCutaneous every 12 hours  HYDROmorphone   Tablet 2 milliGRAM(s) Oral every 6 hours PRN  ibuprofen  Tablet. 600 milliGRAM(s) Oral every 6 hours    SOCIAL HISTORY: No tobacco, no alcohol, no illicit druygs    FAMILY HISTORY: No pertinent family history relating to chief complaint    Drug Dosing Weight  Height (cm): 154.94 (2019 17:48)  Weight (kg): 83.9 (2019 17:48)  BMI (kg/m2): 34.9 (2019 17:48)  BSA (m2): 1.83 (2019 17:48)    PE:    Vital Signs Last 24 Hrs  T(C): 37.1 (2019 05:58), Max: 37.1 (2019 05:58)  T(F): 98.7 (2019 05:58), Max: 98.7 (2019 05:58)  HR: 86 (2019 05:58) (84 - 92)  BP: 124/82 (2019 05:58) (94/67 - 124/82)  RR: 16 (2019 05:58) (16 - 18)  SpO2: 93% (2019 05:58) (93% - 100%)    Gen: AOx3, NAD, non-toxic  CV: S1+S2 normal, nontachycardic  Resp: Clear bilat, no resp distress, no crackles/wheezes  Abd: Soft, Mild RLQ abd pain, no pain to palpation RUQ  Ext: No LE edema, no wounds  : No Jorgensen  IV/Skin: No thrombophlebitis  Msk: No low back pain, no arthralgias, no joint swelling  Neuro: No sensory deficits, no motor deficits    LABS:                        8.6    24.02 )-----------( 361      ( 2019 07:21 )             27.8     11-    139  |  102  |  9   ----------------------------<  124<H>  3.7   |  27  |  0.56    Ca    9.3      2019 07:17    TPro  6.2  /  Alb  3.1<L>  /  TBili  0.3  /  DBili  x   /  AST  8<L>  /  ALT  12  /  AlkPhos  61      Urinalysis Basic - ( 2019 23:00 )    Color: Colorless / Appearance: Clear / S.008 / pH: x  Gluc: x / Ketone: Negative  / Bili: Negative / Urobili: Negative   Blood: x / Protein: Negative / Nitrite: Negative   Leuk Esterase: Negative / RBC: x / WBC x   Sq Epi: x / Non Sq Epi: x / Bacteria: x    MICROBIOLOGY:    .Urine Clean Catch (Midstream)  19   No growth    .Blood Blood-Peripheral  19   No growth to date.    (otherwise reviewed)    RADIOLOGY:     US:    IMPRESSION:    Complex right adnexal mass with suspicion of adjacent dilated, hyperemic   and thickened right fallopian tube. Contrast-enhanced pelvic MRI   recommended for further characterization, if there are no   contraindications.     CXR:    FINDINGS/  IMPRESSION:    The lungs are clear. No pleural effusion or pneumothorax.    Heart size cannot be accurately evaluated on this projection.

## 2019-11-13 NOTE — CONSULT NOTE ADULT - ASSESSMENT
43 yo  LMP 10/25 presenting w/ fever/chills since Friday and abdominal pain.  Fever, leukocytosis  USG with R sided ovarian mass/lesion  Clinical presentation consistent with TOA, but also concern for possible mass  Based on fever, WBC, acute onset--would treat for TOA for now  Overall, TOA, fever, leukocytosis, sepsis  - Cefotetan 2g q 12  - Doxy 100mg q 12  - Flagyl 500mg q 12 (would decrease dose)  - Low threshold to escalate abx to carbapenem if clinical worsening  - Recommend IR eval for drainage--would be therapeutic and diagnostic (check cultures)  - F/U OBGYN  - Discussed case with OBGYN team    Shay Hartley MD  Pager [phone]  After 5pm and on weekends call [phone] 45 yo  LMP 10/25 presenting w/ fever/chills since Friday and abdominal pain.  Fever, leukocytosis  USG with R sided ovarian mass/lesion  Clinical presentation consistent with TOA, but also concern for possible mass  Based on fever, WBC, acute onset--would treat for TOA for now  Overall, TOA, fever, leukocytosis, sepsis  - Cefotetan 2g q 12  - Doxy 100mg q 12  - Flagyl 500mg q 12 (would decrease dose)  - Low threshold to escalate abx to carbapenem if clinical worsening  - Recommend IR eval for drainage--would be therapeutic and diagnostic (check cultures)  - F/U OBGYN  - F/U pending culture  - Discussed case with OBGYN team    Shay Hartley MD  Pager [phone]  After 5pm and on weekends call [phone]

## 2019-11-13 NOTE — CHART NOTE - NSCHARTNOTEFT_GEN_A_CORE
45 yo  LMP 10/25 presenting w/ fever/chills since Friday and abdominal pain., fever, leukocytosis.    Ultrasound showing R sided ovarian lesion,    CT shows   < from: CT Abdomen and Pelvis w/ IV Cont (19 @ 14:34) >    A 7.1 cm complex cystic right adnexal mass with internal calcifications   and regional inflammatory change, compatible with provided history of  tubo-ovarian abscess (TOA). Calcifications may be seen in the setting of   tuberculous TOA.    < end of copied text >    Reviewed case and imaging with attending, due to location of mass high in the pelvis and multiloculated nature of mass with no dominant locule, risks greatly outweigh the benefits of image guided drainage.

## 2019-11-13 NOTE — PROGRESS NOTE ADULT - ASSESSMENT
44yoF admitted for TOA with fever/chills, tachycardia and RLQ abdominal pain since Friday and with concurrent imaging studies.  Patient currently afebrile and non-tachycardic on IV Cefotetan, Doxy and Flagyl.  Breakthrough pain not currently controlled with pain medication regimen.

## 2019-11-13 NOTE — PROGRESS NOTE ADULT - PROBLEM SELECTOR PLAN 1
Neuro: PO tylenol, motrin, add one dose of PO Dilaudid  CV: non-tachycardic, continue to monitor vitals  Pulm: saturating well on room air, continue to increase ambulation  GI: Reg diet  : voiding spontaneously   Heme: HSQ, f/u CBC/BMP  ID: currently afebrile, c/w Cefotetan, Doxy, Flagyl, f/u BCx, UCx  FEN: SLIV    Jose Bee, PGY1.

## 2019-11-14 ENCOUNTER — TRANSCRIPTION ENCOUNTER (OUTPATIENT)
Age: 44
End: 2019-11-14

## 2019-11-14 LAB
ANION GAP SERPL CALC-SCNC: 14 MMOL/L — SIGNIFICANT CHANGE UP (ref 5–17)
BUN SERPL-MCNC: 10 MG/DL — SIGNIFICANT CHANGE UP (ref 7–23)
CALCIUM SERPL-MCNC: 9 MG/DL — SIGNIFICANT CHANGE UP (ref 8.4–10.5)
CHLORIDE SERPL-SCNC: 100 MMOL/L — SIGNIFICANT CHANGE UP (ref 96–108)
CO2 SERPL-SCNC: 26 MMOL/L — SIGNIFICANT CHANGE UP (ref 22–31)
CREAT SERPL-MCNC: 0.61 MG/DL — SIGNIFICANT CHANGE UP (ref 0.5–1.3)
GLUCOSE SERPL-MCNC: 106 MG/DL — HIGH (ref 70–99)
HCT VFR BLD CALC: 24 % — LOW (ref 34.5–45)
HGB BLD-MCNC: 7.7 G/DL — LOW (ref 11.5–15.5)
MCHC RBC-ENTMCNC: 21.8 PG — LOW (ref 27–34)
MCHC RBC-ENTMCNC: 32.1 GM/DL — SIGNIFICANT CHANGE UP (ref 32–36)
MCV RBC AUTO: 67.8 FL — LOW (ref 80–100)
NRBC # BLD: 0 /100 WBCS — SIGNIFICANT CHANGE UP (ref 0–0)
PLATELET # BLD AUTO: 329 K/UL — SIGNIFICANT CHANGE UP (ref 150–400)
POTASSIUM SERPL-MCNC: 3.4 MMOL/L — LOW (ref 3.5–5.3)
POTASSIUM SERPL-SCNC: 3.4 MMOL/L — LOW (ref 3.5–5.3)
RBC # BLD: 3.54 M/UL — LOW (ref 3.8–5.2)
RBC # FLD: 16.6 % — HIGH (ref 10.3–14.5)
SODIUM SERPL-SCNC: 140 MMOL/L — SIGNIFICANT CHANGE UP (ref 135–145)
WBC # BLD: 21.76 K/UL — HIGH (ref 3.8–10.5)
WBC # FLD AUTO: 21.76 K/UL — HIGH (ref 3.8–10.5)

## 2019-11-14 PROCEDURE — 99232 SBSQ HOSP IP/OBS MODERATE 35: CPT

## 2019-11-14 PROCEDURE — 99251: CPT

## 2019-11-14 RX ORDER — POTASSIUM CHLORIDE 20 MEQ
20 PACKET (EA) ORAL ONCE
Refills: 0 | Status: COMPLETED | OUTPATIENT
Start: 2019-11-14 | End: 2019-11-14

## 2019-11-14 RX ADMIN — Medication 600 MILLIGRAM(S): at 18:45

## 2019-11-14 RX ADMIN — Medication 975 MILLIGRAM(S): at 03:08

## 2019-11-14 RX ADMIN — Medication 600 MILLIGRAM(S): at 06:05

## 2019-11-14 RX ADMIN — HYDROMORPHONE HYDROCHLORIDE 2 MILLIGRAM(S): 2 INJECTION INTRAMUSCULAR; INTRAVENOUS; SUBCUTANEOUS at 16:15

## 2019-11-14 RX ADMIN — Medication 975 MILLIGRAM(S): at 14:23

## 2019-11-14 RX ADMIN — Medication 975 MILLIGRAM(S): at 23:51

## 2019-11-14 RX ADMIN — ONDANSETRON 4 MILLIGRAM(S): 8 TABLET, FILM COATED ORAL at 13:34

## 2019-11-14 RX ADMIN — Medication 600 MILLIGRAM(S): at 06:35

## 2019-11-14 RX ADMIN — Medication 100 GRAM(S): at 17:59

## 2019-11-14 RX ADMIN — Medication 100 MILLIGRAM(S): at 09:39

## 2019-11-14 RX ADMIN — HEPARIN SODIUM 5000 UNIT(S): 5000 INJECTION INTRAVENOUS; SUBCUTANEOUS at 18:00

## 2019-11-14 RX ADMIN — Medication 600 MILLIGRAM(S): at 17:59

## 2019-11-14 RX ADMIN — Medication 975 MILLIGRAM(S): at 03:38

## 2019-11-14 RX ADMIN — Medication 20 MILLIEQUIVALENT(S): at 09:12

## 2019-11-14 RX ADMIN — Medication 100 MILLIGRAM(S): at 21:04

## 2019-11-14 RX ADMIN — HEPARIN SODIUM 5000 UNIT(S): 5000 INJECTION INTRAVENOUS; SUBCUTANEOUS at 06:05

## 2019-11-14 RX ADMIN — Medication 975 MILLIGRAM(S): at 09:12

## 2019-11-14 RX ADMIN — Medication 110 MILLIGRAM(S): at 06:05

## 2019-11-14 RX ADMIN — Medication 600 MILLIGRAM(S): at 12:00

## 2019-11-14 RX ADMIN — Medication 100 GRAM(S): at 06:05

## 2019-11-14 RX ADMIN — HYDROMORPHONE HYDROCHLORIDE 2 MILLIGRAM(S): 2 INJECTION INTRAMUSCULAR; INTRAVENOUS; SUBCUTANEOUS at 17:00

## 2019-11-14 RX ADMIN — Medication 110 MILLIGRAM(S): at 18:39

## 2019-11-14 NOTE — CONSULT NOTE ADULT - SUBJECTIVE AND OBJECTIVE BOX
Interventional Radiology    Mrs. Cole is a 45 y/o Female who presented with fever/chills and abdominal pain x1week. Out patient labs obtained showed leukocytosis. CT obtained yesterday demonstrated A "7.1 cm complex cystic right adnexal mass with internal calcifications and regional inflammatory change, compatible with provided history of tubo-ovarian abscess (TOA)." IR consulted for drainage of TOA.     Patient currently on 2g of Cefotetan q12hr, 100mg of doxycycline q12hr and 500mg of flagyl q12hr.       ROS:  General:  No wt loss, fevers, chills, night sweats  Eyes:  Good vision, no reported pain  CV:  No pain, palpitations,   Resp:  No dyspnea, cough, tachypnea, wheezing  GI:  No pain, nausea, vomiting, diarrhea, constipation  :  No pain, bleeding, incontinence, nocturia  Muscle:  No pain, weakness  Neuro:  No weakness, tingling, memory problems  Psych:  No fatigue, insomnia, mood problems, depression  Endocrine:  No polyuria, polydypsia, cold/heat intolerance  Heme:  No petechiae, ecchymosis, easy bruisability  Skin:  No rash, tattoos, scars, edema    PAST MEDICAL & SURGICAL HISTORY:  HLD (hyperlipidemia)  No significant past surgical history      Allergies: No Known Allergies      SOCIAL HISTORY:    FAMILY HISTORY:        PHYSICAL EXAM:    Vital Signs Last 24 Hrs  T(C): 36.9 (14 Nov 2019 13:43), Max: 37 (14 Nov 2019 01:22)  T(F): 98.4 (14 Nov 2019 13:43), Max: 98.6 (14 Nov 2019 01:22)  HR: 84 (14 Nov 2019 13:43) (79 - 92)  BP: 124/86 (14 Nov 2019 13:43) (109/71 - 124/86)  BP(mean): --  RR: 18 (14 Nov 2019 13:43) (16 - 18)  SpO2: 99% (14 Nov 2019 13:43) (97% - 100%)    Gen:  Abd:    LABS:                        7.7    21.76 )-----------( 329      ( 14 Nov 2019 07:20 )             24.0     11-14    140  |  100  |  10  ----------------------------<  106<H>  3.4<L>   |  26  |  0.61    Ca    9.0      14 Nov 2019 07:20    Radiology:  < from: US Doppler Pelvis (11.12.19 @ 00:46) >  IMPRESSION:    Complex right adnexal mass with suspicion of adjacent dilated, hyperemic   and thickened right fallopian tube. Contrast-enhanced pelvic MRI   recommended for further characterization, if there are no   contraindications.    < end of copied text >      A/P: 43y/o female admitted with fever/ chills and abdominal pain found to right TOA on CT abd/ pelvis 11/13/19. IR consulted for drainage.   -Imaging and case reviewed with Dr. Lindo. Due to location of mass high in the pelvis and multiloculated nature of the collection with no dominant locule, risks greatly outweigh the benefits of image guided drainage.  -Recommend continuing conservative management.   -Continue abx therapy per ID recs  -Continue adequate pain control  -Should patients clinical status change recommend repeat imaging and re-consult IR at that time.   -Please contact IR at [phone] with any questions/ concerns regarding above Interventional Radiology    Mrs. Cole is a 45 y/o Female who presented with fever/chills and abdominal pain x1week. Out patient labs obtained showed leukocytosis. CT obtained yesterday demonstrated A "7.1 cm complex cystic right adnexal mass with internal calcifications and regional inflammatory change, compatible with provided history of tubo-ovarian abscess (TOA)." IR consulted for drainage of TOA.     Patient seen and examined at bedside. She reports symptoms have minimally improved. She states having 10/10 pain about 1hr ago which improved with PO Dilaudid. She reports decreased appetite and feeling bloated.  Last bowel movement was today which she states was "dark black/green".      She is currently receiving  975mg  of PO Tylenol q6hr and 2mg Dilaudid PRN for pain.     Patient currently on 2g of Cefotetan q12hr, 100mg of doxycycline q12hr and 500mg of flagyl q12hr.       ROS:  General:  No wt loss, fevers, chills, night sweats  Eyes:  Good vision, no reported pain  CV:  No pain, palpitations,   Resp:  No dyspnea, cough, tachypnea, wheezing  GI:  No pain, nausea, vomiting, diarrhea, constipation  :  No pain, bleeding, incontinence, nocturia  Muscle:  No pain, weakness  Neuro:  No weakness, tingling, memory problems  Psych:  No fatigue, insomnia, mood problems, depression  Endocrine:  No polyuria, polydypsia, cold/heat intolerance  Heme:  No petechiae, ecchymosis, easy bruisability  Skin:  No rash, tattoos, scars, edema    PAST MEDICAL & SURGICAL HISTORY:  HLD (hyperlipidemia)  No significant past surgical history      Allergies: No Known Allergies      PHYSICAL EXAM:    Vital Signs Last 24 Hrs  T(C): 36.9 (14 Nov 2019 13:43), Max: 37 (14 Nov 2019 01:22)  T(F): 98.4 (14 Nov 2019 13:43), Max: 98.6 (14 Nov 2019 01:22)  HR: 84 (14 Nov 2019 13:43) (79 - 92)  BP: 124/86 (14 Nov 2019 13:43) (109/71 - 124/86)  RR: 18 (14 Nov 2019 13:43) (16 - 18)  SpO2: 99% (14 Nov 2019 13:43) (97% - 100%)    Gen: NAD, A&Ox4  Abd: distended, soft,  tenderness over RLQ and epigastric region with deep palpation.     LABS:                        7.7    21.76 )-----------( 329      ( 14 Nov 2019 07:20 )             24.0     11-14    140  |  100  |  10  ----------------------------<  106<H>  3.4<L>   |  26  |  0.61    Ca    9.0      14 Nov 2019 07:20    Radiology:  < from: US Doppler Pelvis (11.12.19 @ 00:46) >  IMPRESSION:    Complex right adnexal mass with suspicion of adjacent dilated, hyperemic   and thickened right fallopian tube. Contrast-enhanced pelvic MRI   recommended for further characterization, if there are no   contraindications.    < end of copied text >      A/P: 45y/o female admitted with fever/ chills and abdominal pain found to right TOA on CT abd/ pelvis 11/13/19. IR consulted for drainage.   -Imaging and case reviewed with Dr. Lindo. Due to location of mass high in the pelvis and multiloculated nature of the collection with no dominant locule, risks greatly outweigh the benefits of image guided drainage.  -WBC slowly downtrending. Pt afebrile however, she is on q6 of Tylenol.   -Recommend continuing conservative management.   -Continue abx therapy per ID recs  -Continue adequate pain control  -Should patients clinical status change recommend repeat imaging and re-consult IR at that time.   -Please contact IR at [phone] with any questions/ concerns regarding above Interventional Radiology    Mrs. Cole is a 45 y/o Female who presented with fever/chills and abdominal pain x1week. Out patient labs obtained showed leukocytosis. CT obtained yesterday demonstrated A "7.1 cm complex cystic right adnexal mass with internal calcifications and regional inflammatory change, compatible with provided history of tubo-ovarian abscess (TOA)." IR consulted for drainage of TOA.     Patient seen and examined at bedside. She reports symptoms have minimally improved. She states having 10/10 pain about 1hr ago which improved with PO Dilaudid. She reports decreased appetite and feeling bloated.  Last bowel movement was today which she states was "dark black/green".      She is currently receiving  975mg  of PO Tylenol q6hr and 2mg Dilaudid PRN for pain.     Patient currently on 2g of Cefotetan q12hr, 100mg of doxycycline q12hr and 500mg of flagyl q12hr.       ROS:  General:  No wt loss, fevers, chills, night sweats  Eyes:  Good vision, no reported pain  CV:  No pain, palpitations,   Resp:  No dyspnea, cough, tachypnea, wheezing  GI:  No pain, nausea, vomiting, diarrhea, constipation  :  No pain, bleeding, incontinence, nocturia  Muscle:  No pain, weakness  Neuro:  No weakness, tingling, memory problems  Psych:  No fatigue, insomnia, mood problems, depression  Endocrine:  No polyuria, polydypsia, cold/heat intolerance  Heme:  No petechiae, ecchymosis, easy bruisability  Skin:  No rash, tattoos, scars, edema    PAST MEDICAL & SURGICAL HISTORY:  HLD (hyperlipidemia)  No significant past surgical history      Allergies: No Known Allergies      PHYSICAL EXAM:    Vital Signs Last 24 Hrs  T(C): 36.9 (14 Nov 2019 13:43), Max: 37 (14 Nov 2019 01:22)  T(F): 98.4 (14 Nov 2019 13:43), Max: 98.6 (14 Nov 2019 01:22)  HR: 84 (14 Nov 2019 13:43) (79 - 92)  BP: 124/86 (14 Nov 2019 13:43) (109/71 - 124/86)  RR: 18 (14 Nov 2019 13:43) (16 - 18)  SpO2: 99% (14 Nov 2019 13:43) (97% - 100%)    Gen: NAD, A&Ox4  Abd: distended, soft,  tenderness over RLQ and epigastric region with deep palpation.     LABS:                        7.7    21.76 )-----------( 329      ( 14 Nov 2019 07:20 )             24.0     11-14    140  |  100  |  10  ----------------------------<  106<H>  3.4<L>   |  26  |  0.61    Ca    9.0      14 Nov 2019 07:20    Radiology:  < from: US Doppler Pelvis (11.12.19 @ 00:46) >  IMPRESSION:    Complex right adnexal mass with suspicion of adjacent dilated, hyperemic   and thickened right fallopian tube. Contrast-enhanced pelvic MRI   recommended for further characterization, if there are no   contraindications.    < end of copied text >      A/P: 45y/o female admitted with fever/ chills and abdominal pain found to right TOA on CT abd/ pelvis 11/13/19. IR consulted for drainage.   -Imaging and case reviewed with Dr. Lindo. Due to location of mass high in the pelvis and multiloculated nature of the collection with no dominant locule, risks greatly outweigh the benefits of image guided drainage.  -WBC slowly downtrending. Pt afebrile however, she is on q6 of Tylenol.   -Recommend continuing conservative management.   -Continue abx therapy per ID recs  -Continue adequate pain control  -Should patients clinical status change recommend repeat imaging and re-consult IR at that time.   -Above discussed with patient in detail. She verbalizes understanding.   -Please contact IR at [phone] with any questions/ concerns regarding above

## 2019-11-14 NOTE — PROGRESS NOTE ADULT - PROBLEM SELECTOR PLAN 1
Plan: Neuro: PO tylenol, motrin, add one dose of PO Dilaudid  CV: non-tachycardic, continue to monitor vitals  Pulm: saturating well on room air, continue to increase ambulation  GI: Reg diet  : voiding spontaneously   Heme: HSQ, f/u CBC/BMP  ID: currently afebrile, c/w Cefotetan, Doxy, Flagyl, f/u BCx, UCx  FEN: SLIV Neuro: PO tylenol, motrin, po dilaudid PRN  CV: non-tachycardic, continue to monitor vitals  Pulm: saturating well on room air, continue to increase ambulation  GI: Reg diet  : voiding spontaneously   Heme: HSQ for DVT ppx, f/u CBC to eval leukocytosis  ID: currently afebrile, c/w Cefotetan, Doxy, Flagyl, Bcx/Ucx NGTD, HIV/RPR neg, GC/CT negative. Appreciate ID recs - do not recommend drainage at this time. F/u quant gold for milliary TB  FEN: LR@125    C Miky pgy3

## 2019-11-14 NOTE — PROGRESS NOTE ADULT - ASSESSMENT
45 yo  LMP 10/25 presenting w/ fever/chills since Friday and abdominal pain.  Fever, leukocytosis  USG with R sided ovarian mass/lesion  CT consistent with TOA, although calcifications noted  Still leukocytosis, patient clinically improved  Overall, TOA, fever, leukocytosis, sepsis  - Cefotetan 2g q 12  - Doxy 100mg q 12  - Flagyl 500mg q 12  - Low threshold to escalate abx to carbapenem if clinical worsening  - F/U IR--if non improving, would still pursue IR drainage as feasible despite loculations  - F/U OBGYN  - F/U pending cultures  - Trend WBC, monitor clinical status    Shay Hartley MD  Pager [phone]  After 5pm and on weekends call [phone]

## 2019-11-14 NOTE — PROGRESS NOTE ADULT - ASSESSMENT
44yoF admitted for TOA with fever/chills, tachycardia and RLQ abdominal pain since Friday and with concurrent imaging studies.  Patient currently afebrile and non-tachycardic on IV Cefotetan, Doxy and Flagyl.  Breakthrough pain not currently controlled with Dilaudid PO.  IR not amenable to US guided drainage. 44yoF admitted for TOA with fever/chills, tachycardia and RLQ abdominal pain since Friday and with concurrent imaging studies.  Patient currently afebrile and non-tachycardic on IV Cefotetan, Doxy and Flagyl.  Breakthrough pain controlled with Dilaudid PO.  IR not amenable to US guided drainage.

## 2019-11-14 NOTE — PROGRESS NOTE ADULT - SUBJECTIVE AND OBJECTIVE BOX
GYN Progress Note    POD#__   HD#__    Patient seen and examined at bedside.  No acute events overnight. No acute complaints.  Pain well controlled.  Patient is ambulating and tolerating __ diet.  __Has not yet passed flatus vs. Patient is passing flatus.   __Patient is voiding spontanously vs. Jorgensen is still in place.   Denies CP, SOB, N/V, fevers, and chills.    Vital Signs Last 24 Hours  T(C): 37 (11-14-19 @ 01:22), Max: 37.1 (11-13-19 @ 05:58)  HR: 92 (11-14-19 @ 01:22) (80 - 92)  BP: 109/71 (11-14-19 @ 01:22) (109/71 - 124/82)  RR: 17 (11-14-19 @ 01:22) (16 - 18)  SpO2: 98% (11-14-19 @ 01:22) (93% - 99%)    I&O's Summary    12 Nov 2019 07:01  -  13 Nov 2019 07:00  --------------------------------------------------------  IN: 2760 mL / OUT: 0 mL / NET: 2760 mL    13 Nov 2019 07:01  -  14 Nov 2019 05:41  --------------------------------------------------------  IN: 2575 mL / OUT: 0 mL / NET: 2575 mL        Physical Exam:  General: NAD  CV: RRR  Lungs: CTAB  Abdomen: soft, appropriately-tender, softly distended, normoactive bowel sounds  Incision: __ CDI  : no bleeding on pad  Ext: no pain or swelling     Labs:                        8.6    24.02 )-----------( 361      ( 13 Nov 2019 07:21 )             27.8   baso x      eos x      imm gran x      lymph x      mono x      poly x                            8.5    21.96 )-----------( 337      ( 12 Nov 2019 05:56 )             27.7   baso 0.2    eos 0.3    imm gran 1.3    lymph 11.7   mono 5.4    poly 81.1                         9.3    20.29 )-----------( 383      ( 11 Nov 2019 18:30 )             30.5   baso 0.0    eos 0.0    imm gran x      lymph 14.9   mono 4.4    poly 80.7       MEDICATIONS  (STANDING):  acetaminophen   Tablet .. 975 milliGRAM(s) Oral every 6 hours  cefoTEtan  IVPB      cefoTEtan  IVPB 2 Gram(s) IV Intermittent every 12 hours  doxycycline IVPB 100 milliGRAM(s) IV Intermittent every 12 hours  heparin  Injectable 5000 Unit(s) SubCutaneous every 12 hours  ibuprofen  Tablet. 600 milliGRAM(s) Oral every 6 hours  lactated ringers. 1000 milliLiter(s) (125 mL/Hr) IV Continuous <Continuous>  metroNIDAZOLE  IVPB 500 milliGRAM(s) IV Intermittent every 12 hours    MEDICATIONS  (PRN):  HYDROmorphone   Tablet 2 milliGRAM(s) Oral every 4 hours PRN Severe Pain (7 - 10)  ondansetron Injectable 4 milliGRAM(s) IV Push every 6 hours PRN Nausea and/or Vomiting GYN Progress Note        Subjective  Patient seen and examined at bedside.  No acute events overnight. This AM, patient reports significant improvement in symptoms. She states that her pain is significantly improved, now stating pain is 0/10 and describes her lower abdomen as "achy" intermittently. She states that she when uses the restroom, she does not experience pain as she previously did. She is ambulating, tolerating po and voiding spontaneously. Denies CP, SOB, N/V, fevers, and chills.    Objective  Vital Signs Last 24 Hours  T(C): 37 (11-14-19 @ 01:22), Max: 37.1 (11-13-19 @ 05:58)  HR: 92 (11-14-19 @ 01:22) (80 - 92)  BP: 109/71 (11-14-19 @ 01:22) (109/71 - 124/82)  RR: 17 (11-14-19 @ 01:22) (16 - 18)  SpO2: 98% (11-14-19 @ 01:22) (93% - 99%)    I&O's Summary    12 Nov 2019 07:01  -  13 Nov 2019 07:00  --------------------------------------------------------  IN: 2760 mL / OUT: 0 mL / NET: 2760 mL    13 Nov 2019 07:01  -  14 Nov 2019 05:41  --------------------------------------------------------  IN: 2575 mL / OUT: 0 mL / NET: 2575 mL        Physical Exam:  General: NAD  CV: RRR  Lungs: CTAB  Abdomen: soft, non-tender, no guarding or rebound, softly distended, normoactive bowel sounds  Ext: no pain or swelling     Labs:                        8.6    24.02 )-----------( 361      ( 13 Nov 2019 07:21 )             27.8   baso x      eos x      imm gran x      lymph x      mono x      poly x                            8.5    21.96 )-----------( 337      ( 12 Nov 2019 05:56 )             27.7   baso 0.2    eos 0.3    imm gran 1.3    lymph 11.7   mono 5.4    poly 81.1                         9.3    20.29 )-----------( 383      ( 11 Nov 2019 18:30 )             30.5   baso 0.0    eos 0.0    imm gran x      lymph 14.9   mono 4.4    poly 80.7       MEDICATIONS  (STANDING):  acetaminophen   Tablet .. 975 milliGRAM(s) Oral every 6 hours  cefoTEtan  IVPB      cefoTEtan  IVPB 2 Gram(s) IV Intermittent every 12 hours  doxycycline IVPB 100 milliGRAM(s) IV Intermittent every 12 hours  heparin  Injectable 5000 Unit(s) SubCutaneous every 12 hours  ibuprofen  Tablet. 600 milliGRAM(s) Oral every 6 hours  lactated ringers. 1000 milliLiter(s) (125 mL/Hr) IV Continuous <Continuous>  metroNIDAZOLE  IVPB 500 milliGRAM(s) IV Intermittent every 12 hours    MEDICATIONS  (PRN):  HYDROmorphone   Tablet 2 milliGRAM(s) Oral every 4 hours PRN Severe Pain (7 - 10)  ondansetron Injectable 4 milliGRAM(s) IV Push every 6 hours PRN Nausea and/or Vomiting GYN Progress Note        Subjective  Patient seen and examined at bedside.  No acute events overnight. This AM, patient reports significant improvement in symptoms. She states that her pain is significantly improved, now stating pain is 0/10 and describes her lower abdomen as "achy" intermittently. She states that she when uses the restroom, she does not experience pain as she previously did. She is ambulating, tolerating po and voiding spontaneously. Denies CP, SOB, N/V, fevers, and chills.    Objective  Vital Signs Last 24 Hours  T(C): 37 (11-14-19 @ 01:22), Max: 37.1 (11-13-19 @ 05:58)  HR: 92 (11-14-19 @ 01:22) (80 - 92)  BP: 109/71 (11-14-19 @ 01:22) (109/71 - 124/82)  RR: 17 (11-14-19 @ 01:22) (16 - 18)  SpO2: 98% (11-14-19 @ 01:22) (93% - 99%)    I&O's Summary    12 Nov 2019 07:01  -  13 Nov 2019 07:00  --------------------------------------------------------  IN: 2760 mL / OUT: 0 mL / NET: 2760 mL    13 Nov 2019 07:01  -  14 Nov 2019 05:41  --------------------------------------------------------  IN: 2575 mL / OUT: 0 mL / NET: 2575 mL        Physical Exam:  General: NAD  CV: RRR  Lungs: CTAB  Abdomen: soft, non-tender, no guarding or rebound, softly distended, normoactive bowel sounds  Ext: no pain or swelling     Labs:                          7.7    21.76 )-----------( 329      ( 14 Nov 2019 07:20 )             24.0                           8.6    24.02 )-----------( 361      ( 13 Nov 2019 07:21 )             27.8   baso x      eos x      imm gran x      lymph x      mono x      poly x                            8.5    21.96 )-----------( 337      ( 12 Nov 2019 05:56 )             27.7   baso 0.2    eos 0.3    imm gran 1.3    lymph 11.7   mono 5.4    poly 81.1                         9.3    20.29 )-----------( 383      ( 11 Nov 2019 18:30 )             30.5   baso 0.0    eos 0.0    imm gran x      lymph 14.9   mono 4.4    poly 80.7       MEDICATIONS  (STANDING):  acetaminophen   Tablet .. 975 milliGRAM(s) Oral every 6 hours  cefoTEtan  IVPB      cefoTEtan  IVPB 2 Gram(s) IV Intermittent every 12 hours  doxycycline IVPB 100 milliGRAM(s) IV Intermittent every 12 hours  heparin  Injectable 5000 Unit(s) SubCutaneous every 12 hours  ibuprofen  Tablet. 600 milliGRAM(s) Oral every 6 hours  lactated ringers. 1000 milliLiter(s) (125 mL/Hr) IV Continuous <Continuous>  metroNIDAZOLE  IVPB 500 milliGRAM(s) IV Intermittent every 12 hours    MEDICATIONS  (PRN):  HYDROmorphone   Tablet 2 milliGRAM(s) Oral every 4 hours PRN Severe Pain (7 - 10)  ondansetron Injectable 4 milliGRAM(s) IV Push every 6 hours PRN Nausea and/or Vomiting

## 2019-11-14 NOTE — PROGRESS NOTE ADULT - SUBJECTIVE AND OBJECTIVE BOX
CC: F/U for TOA    Saw/spoke to patient. No fevers, no chills. Abd pain improved. No new complaints.    Allergies  No Known Allergies    ANTIMICROBIALS:  cefoTEtan  IVPB    cefoTEtan  IVPB 2 every 12 hours  doxycycline IVPB 100 every 12 hours  metroNIDAZOLE  IVPB 500 every 12 hours    PE:    Vital Signs Last 24 Hrs  T(C): 36.9 (14 Nov 2019 10:46), Max: 37 (13 Nov 2019 16:16)  T(F): 98.4 (14 Nov 2019 10:46), Max: 98.6 (13 Nov 2019 16:16)  HR: 83 (14 Nov 2019 10:46) (79 - 92)  BP: 117/71 (14 Nov 2019 10:46) (109/71 - 122/84)  RR: 16 (14 Nov 2019 10:46) (16 - 18)  SpO2: 100% (14 Nov 2019 10:46) (97% - 100%)    Gen: AOx3, NAD, non-toxic, pleasant  CV: S1+S2 normal, nontachycardic  Resp: Clear bilat, no resp distress, no crackles/wheezes  Abd: Soft, nontender, +BS  Ext: No LE edema, no wounds  LABS:                        7.7    21.76 )-----------( 329      ( 14 Nov 2019 07:20 )             24.0     11-14    140  |  100  |  10  ----------------------------<  106<H>  3.4<L>   |  26  |  0.61    Ca    9.0      14 Nov 2019 07:20    MICROBIOLOGY:    .Urine Clean Catch (Midstream)  11-12-19   No growth     .Blood Blood-Peripheral  11-11-19   No growth to date.     RADIOLOGY:    11/13 CT:    IMPRESSION:     A 7.1 cm complex cystic right adnexal mass with internal calcifications   and regional inflammatory change, compatible with provided history of   tubo-ovarian abscess (TOA). Calcifications may be seen in the setting of   tuberculous TOA. Follow-up to resolution recommended to exclude   underlying mass.    Mildly enlarged retroperitoneal lymph nodes.

## 2019-11-15 ENCOUNTER — TRANSCRIPTION ENCOUNTER (OUTPATIENT)
Age: 44
End: 2019-11-15

## 2019-11-15 ENCOUNTER — RESULT REVIEW (OUTPATIENT)
Age: 44
End: 2019-11-15

## 2019-11-15 LAB
ANION GAP SERPL CALC-SCNC: 14 MMOL/L — SIGNIFICANT CHANGE UP (ref 5–17)
BLD GP AB SCN SERPL QL: NEGATIVE — SIGNIFICANT CHANGE UP
BUN SERPL-MCNC: 8 MG/DL — SIGNIFICANT CHANGE UP (ref 7–23)
CALCIUM SERPL-MCNC: 8.9 MG/DL — SIGNIFICANT CHANGE UP (ref 8.4–10.5)
CHLORIDE SERPL-SCNC: 103 MMOL/L — SIGNIFICANT CHANGE UP (ref 96–108)
CO2 SERPL-SCNC: 24 MMOL/L — SIGNIFICANT CHANGE UP (ref 22–31)
CREAT SERPL-MCNC: 0.54 MG/DL — SIGNIFICANT CHANGE UP (ref 0.5–1.3)
GAMMA INTERFERON BACKGROUND BLD IA-ACNC: 0.04 IU/ML — SIGNIFICANT CHANGE UP
GLUCOSE SERPL-MCNC: 101 MG/DL — HIGH (ref 70–99)
HCG UR QL: NEGATIVE — SIGNIFICANT CHANGE UP
HCT VFR BLD CALC: 25.2 % — LOW (ref 34.5–45)
HGB BLD-MCNC: 8.1 G/DL — LOW (ref 11.5–15.5)
M TB IFN-G BLD-IMP: NEGATIVE — SIGNIFICANT CHANGE UP
M TB IFN-G CD4+ BCKGRND COR BLD-ACNC: 0 IU/ML — SIGNIFICANT CHANGE UP
M TB IFN-G CD4+CD8+ BCKGRND COR BLD-ACNC: 0.01 IU/ML — SIGNIFICANT CHANGE UP
MCHC RBC-ENTMCNC: 21.7 PG — LOW (ref 27–34)
MCHC RBC-ENTMCNC: 32.1 GM/DL — SIGNIFICANT CHANGE UP (ref 32–36)
MCV RBC AUTO: 67.6 FL — LOW (ref 80–100)
NRBC # BLD: 0 /100 WBCS — SIGNIFICANT CHANGE UP (ref 0–0)
PLATELET # BLD AUTO: 351 K/UL — SIGNIFICANT CHANGE UP (ref 150–400)
POTASSIUM SERPL-MCNC: 4 MMOL/L — SIGNIFICANT CHANGE UP (ref 3.5–5.3)
POTASSIUM SERPL-SCNC: 4 MMOL/L — SIGNIFICANT CHANGE UP (ref 3.5–5.3)
QUANT TB PLUS MITOGEN MINUS NIL: 0.55 IU/ML — SIGNIFICANT CHANGE UP
RBC # BLD: 3.73 M/UL — LOW (ref 3.8–5.2)
RBC # FLD: 16.7 % — HIGH (ref 10.3–14.5)
RH IG SCN BLD-IMP: POSITIVE — SIGNIFICANT CHANGE UP
SODIUM SERPL-SCNC: 141 MMOL/L — SIGNIFICANT CHANGE UP (ref 135–145)
WBC # BLD: 21.09 K/UL — HIGH (ref 3.8–10.5)
WBC # FLD AUTO: 21.09 K/UL — HIGH (ref 3.8–10.5)

## 2019-11-15 PROCEDURE — 99233 SBSQ HOSP IP/OBS HIGH 50: CPT

## 2019-11-15 PROCEDURE — 58661 LAPAROSCOPY REMOVE ADNEXA: CPT

## 2019-11-15 PROCEDURE — 88112 CYTOPATH CELL ENHANCE TECH: CPT | Mod: 26

## 2019-11-15 PROCEDURE — 88305 TISSUE EXAM BY PATHOLOGIST: CPT | Mod: 26

## 2019-11-15 PROCEDURE — 88305 TISSUE EXAM BY PATHOLOGIST: CPT | Mod: 26,59

## 2019-11-15 PROCEDURE — 88302 TISSUE EXAM BY PATHOLOGIST: CPT | Mod: 26

## 2019-11-15 PROCEDURE — 52005 CYSTO W/URTRL CATHJ: CPT

## 2019-11-15 PROCEDURE — 45330 DIAGNOSTIC SIGMOIDOSCOPY: CPT

## 2019-11-15 RX ORDER — ACETAMINOPHEN 500 MG
975 TABLET ORAL EVERY 6 HOURS
Refills: 0 | Status: DISCONTINUED | OUTPATIENT
Start: 2019-11-15 | End: 2019-11-18

## 2019-11-15 RX ORDER — ERTAPENEM SODIUM 1 G/1
1000 INJECTION, POWDER, LYOPHILIZED, FOR SOLUTION INTRAMUSCULAR; INTRAVENOUS EVERY 24 HOURS
Refills: 0 | Status: COMPLETED | OUTPATIENT
Start: 2019-11-15 | End: 2019-11-16

## 2019-11-15 RX ORDER — SODIUM CHLORIDE 9 MG/ML
1000 INJECTION, SOLUTION INTRAVENOUS
Refills: 0 | Status: DISCONTINUED | OUTPATIENT
Start: 2019-11-15 | End: 2019-11-15

## 2019-11-15 RX ORDER — KETOROLAC TROMETHAMINE 30 MG/ML
30 SYRINGE (ML) INJECTION EVERY 8 HOURS
Refills: 0 | Status: DISCONTINUED | OUTPATIENT
Start: 2019-11-15 | End: 2019-11-15

## 2019-11-15 RX ORDER — SODIUM CHLORIDE 9 MG/ML
1000 INJECTION, SOLUTION INTRAVENOUS
Refills: 0 | Status: DISCONTINUED | OUTPATIENT
Start: 2019-11-15 | End: 2019-11-16

## 2019-11-15 RX ORDER — POLYETHYLENE GLYCOL 3350 17 G/17G
17 POWDER, FOR SOLUTION ORAL DAILY
Refills: 0 | Status: DISCONTINUED | OUTPATIENT
Start: 2019-11-15 | End: 2019-11-18

## 2019-11-15 RX ORDER — IBUPROFEN 200 MG
600 TABLET ORAL EVERY 6 HOURS
Refills: 0 | Status: DISCONTINUED | OUTPATIENT
Start: 2019-11-15 | End: 2019-11-18

## 2019-11-15 RX ORDER — SIMETHICONE 80 MG/1
80 TABLET, CHEWABLE ORAL
Refills: 0 | Status: DISCONTINUED | OUTPATIENT
Start: 2019-11-15 | End: 2019-11-18

## 2019-11-15 RX ORDER — HYDROMORPHONE HYDROCHLORIDE 2 MG/ML
0.5 INJECTION INTRAMUSCULAR; INTRAVENOUS; SUBCUTANEOUS
Refills: 0 | Status: DISCONTINUED | OUTPATIENT
Start: 2019-11-15 | End: 2019-11-16

## 2019-11-15 RX ORDER — ONDANSETRON 8 MG/1
4 TABLET, FILM COATED ORAL ONCE
Refills: 0 | Status: DISCONTINUED | OUTPATIENT
Start: 2019-11-15 | End: 2019-11-16

## 2019-11-15 RX ORDER — HYDROMORPHONE HYDROCHLORIDE 2 MG/ML
2 INJECTION INTRAMUSCULAR; INTRAVENOUS; SUBCUTANEOUS EVERY 4 HOURS
Refills: 0 | Status: DISCONTINUED | OUTPATIENT
Start: 2019-11-15 | End: 2019-11-18

## 2019-11-15 RX ORDER — ERTAPENEM SODIUM 1 G/1
1000 INJECTION, POWDER, LYOPHILIZED, FOR SOLUTION INTRAMUSCULAR; INTRAVENOUS EVERY 24 HOURS
Refills: 0 | Status: DISCONTINUED | OUTPATIENT
Start: 2019-11-15 | End: 2019-11-15

## 2019-11-15 RX ORDER — HEPARIN SODIUM 5000 [USP'U]/ML
5000 INJECTION INTRAVENOUS; SUBCUTANEOUS EVERY 12 HOURS
Refills: 0 | Status: DISCONTINUED | OUTPATIENT
Start: 2019-11-15 | End: 2019-11-18

## 2019-11-15 RX ORDER — ACETAMINOPHEN 500 MG
1000 TABLET ORAL ONCE
Refills: 0 | Status: COMPLETED | OUTPATIENT
Start: 2019-11-15 | End: 2019-11-15

## 2019-11-15 RX ADMIN — Medication 110 MILLIGRAM(S): at 07:01

## 2019-11-15 RX ADMIN — HYDROMORPHONE HYDROCHLORIDE 0.5 MILLIGRAM(S): 2 INJECTION INTRAMUSCULAR; INTRAVENOUS; SUBCUTANEOUS at 21:10

## 2019-11-15 RX ADMIN — ONDANSETRON 4 MILLIGRAM(S): 8 TABLET, FILM COATED ORAL at 06:07

## 2019-11-15 RX ADMIN — HEPARIN SODIUM 5000 UNIT(S): 5000 INJECTION INTRAVENOUS; SUBCUTANEOUS at 06:07

## 2019-11-15 RX ADMIN — Medication 30 MILLIGRAM(S): at 13:51

## 2019-11-15 RX ADMIN — ERTAPENEM SODIUM 120 MILLIGRAM(S): 1 INJECTION, POWDER, LYOPHILIZED, FOR SOLUTION INTRAMUSCULAR; INTRAVENOUS at 23:52

## 2019-11-15 RX ADMIN — HYDROMORPHONE HYDROCHLORIDE 0.5 MILLIGRAM(S): 2 INJECTION INTRAMUSCULAR; INTRAVENOUS; SUBCUTANEOUS at 21:35

## 2019-11-15 RX ADMIN — Medication 975 MILLIGRAM(S): at 06:47

## 2019-11-15 RX ADMIN — Medication 100 GRAM(S): at 06:07

## 2019-11-15 RX ADMIN — Medication 600 MILLIGRAM(S): at 02:00

## 2019-11-15 RX ADMIN — HYDROMORPHONE HYDROCHLORIDE 2 MILLIGRAM(S): 2 INJECTION INTRAMUSCULAR; INTRAVENOUS; SUBCUTANEOUS at 07:00

## 2019-11-15 RX ADMIN — Medication 975 MILLIGRAM(S): at 23:53

## 2019-11-15 RX ADMIN — SIMETHICONE 80 MILLIGRAM(S): 80 TABLET, CHEWABLE ORAL at 23:53

## 2019-11-15 RX ADMIN — Medication 975 MILLIGRAM(S): at 06:07

## 2019-11-15 RX ADMIN — HYDROMORPHONE HYDROCHLORIDE 0.5 MILLIGRAM(S): 2 INJECTION INTRAMUSCULAR; INTRAVENOUS; SUBCUTANEOUS at 21:50

## 2019-11-15 RX ADMIN — Medication 975 MILLIGRAM(S): at 00:55

## 2019-11-15 RX ADMIN — Medication 400 MILLIGRAM(S): at 14:04

## 2019-11-15 RX ADMIN — HYDROMORPHONE HYDROCHLORIDE 2 MILLIGRAM(S): 2 INJECTION INTRAMUSCULAR; INTRAVENOUS; SUBCUTANEOUS at 07:30

## 2019-11-15 RX ADMIN — Medication 600 MILLIGRAM(S): at 23:53

## 2019-11-15 RX ADMIN — Medication 600 MILLIGRAM(S): at 00:53

## 2019-11-15 RX ADMIN — HYDROMORPHONE HYDROCHLORIDE 0.5 MILLIGRAM(S): 2 INJECTION INTRAMUSCULAR; INTRAVENOUS; SUBCUTANEOUS at 20:55

## 2019-11-15 NOTE — DISCHARGE NOTE PROVIDER - NSDCACTIVITY_GEN_ALL_CORE
Showering allowed/No heavy lifting/straining/Stairs allowed/Do not make important decisions/Do not drive or operate machinery

## 2019-11-15 NOTE — BRIEF OPERATIVE NOTE - OPERATION/FINDINGS
1. No abnormalities of bladder, atraumatic insertion of bilateral Ucaths to 27cm  Dictation:[id number]
approximately 10cm right sided multilocular TOA encompassing right ovary and fallopian tube. left ovary within normal limits, left fallopian tube with small paratubal cysts, at beginning of procedure cystoperformed and ureteral catheter inserted. evicel placed over operative bed. bubble study performed negative.

## 2019-11-15 NOTE — DISCHARGE NOTE PROVIDER - NSDCFUSCHEDAPPT_GEN_ALL_CORE_FT
JUANITO RIOS ; 11/22/2019 ; [facility]  [address] [facility] JUANITO RIOS ; 11/22/2019 ; [facility]  [address] [facility]

## 2019-11-15 NOTE — BRIEF OPERATIVE NOTE - NSICDXBRIEFPOSTOP_GEN_ALL_CORE_FT
POST-OP DIAGNOSIS:  Tubo-ovarian abscess 15-Nov-2019 17:19:22  Blayne Ford
POST-OP DIAGNOSIS:  Tubo-ovarian abscess 15-Nov-2019 17:19:22  Blayne Ford

## 2019-11-15 NOTE — PRE-ANESTHESIA EVALUATION ADULT - NSANTHPMHFT_GEN_ALL_CORE
44 PMH morbid obesity, HLD p/w RLQ 2/2 tuboovarian abscess, septic on admission, now on IVF and IV abx.

## 2019-11-15 NOTE — PROGRESS NOTE ADULT - SUBJECTIVE AND OBJECTIVE BOX
GYN Progress Note    HD#4    Patient seen and examined at bedside. Patient reports worsening of RLQ pain and nausea overnight. Still tolerating PO but reports she is less hungry and feels bloated and swollen. Continues to pass flatus and have well formed bowel movements. Ambulating without difficulty. Denies CP, SOB, fevers, and chills.    Vital Signs Last 24 Hours  T(C): 36.8 (11-15-19 @ 06:41), Max: 37.1 (11-14-19 @ 21:08)  HR: 82 (11-15-19 @ 06:41) (79 - 99)  BP: 128/86 (11-15-19 @ 06:41) (109/75 - 141/92)  RR: 17 (11-15-19 @ 06:41) (16 - 18)  SpO2: 97% (11-15-19 @ 06:41) (95% - 100%)    I&O's Summary    13 Nov 2019 07:01  -  14 Nov 2019 07:00  --------------------------------------------------------  IN: 3470 mL / OUT: 0 mL / NET: 3470 mL    14 Nov 2019 07:01  -  15 Nov 2019 06:52  --------------------------------------------------------  IN: 3690 mL / OUT: 0 mL / NET: 3690 mL        Physical Exam:  General: NAD  CV: RRR  Lungs: CTAB  Abdomen: soft, softly distended, +RLQ tenderness to palpation  Ext: no pain, increased not-pitting edema from yesterday    Labs:                        7.7    21.76 )-----------( 329      ( 14 Nov 2019 07:20 )             24.0   baso x      eos x      imm gran x      lymph x      mono x      poly x                            8.6    24.02 )-----------( 361      ( 13 Nov 2019 07:21 )             27.8   baso x      eos x      imm gran x      lymph x      mono x      poly x          MEDICATIONS  (STANDING):  acetaminophen   Tablet .. 975 milliGRAM(s) Oral every 6 hours  cefoTEtan  IVPB      cefoTEtan  IVPB 2 Gram(s) IV Intermittent every 12 hours  doxycycline IVPB 100 milliGRAM(s) IV Intermittent every 12 hours  heparin  Injectable 5000 Unit(s) SubCutaneous every 12 hours  ibuprofen  Tablet. 600 milliGRAM(s) Oral every 6 hours  lactated ringers. 1000 milliLiter(s) (125 mL/Hr) IV Continuous <Continuous>  metroNIDAZOLE  IVPB 500 milliGRAM(s) IV Intermittent every 12 hours    MEDICATIONS  (PRN):  HYDROmorphone   Tablet 2 milliGRAM(s) Oral every 4 hours PRN Severe Pain (7 - 10)  ondansetron Injectable 4 milliGRAM(s) IV Push every 6 hours PRN Nausea and/or Vomiting

## 2019-11-15 NOTE — PROGRESS NOTE ADULT - ASSESSMENT
43 yo  LMP 10/25 presenting w/ fever/chills since Friday and abdominal pain.  Fever, leukocytosis  USG with R sided ovarian mass/lesion  CT consistent with TOA, although calcifications noted  Still leukocytosis, patient planned for OR, not improving; escalate abx  Overall, TOA, fever, leukocytosis, sepsis  - Ertapenem 1g q 24  - Doxy 100mg q 12  - DC Cefotetan/Flagyl  - F/U OBGYN--OR planning for today, would send cultures from abscess from OR  - F/U pending cultures  - Trend WBC, monitor clinical status  - Check Urine GC/Chlamydia    Shay Hartley MD  Pager [phone]  After 5pm and on weekends call [phone]

## 2019-11-15 NOTE — CHART NOTE - NSCHARTNOTEFT_GEN_A_CORE
R2 OBGYN POST-OP CHECK    S: Patient seen and evaluated at bedside.  Pt awake and alert resting comfortaby in bed  Patient reports pain controlled with analgesia. Pt denies N/V, SOB, CP, palpitations, fever/chills. Tolerating clears.  Not OOB yet.    O:   T(C): 36.2 (11-15-19 @ 20:30), Max: 36.2 (11-15-19 @ 20:30)  HR: 98 (11-15-19 @ 22:00) (98 - 109)  BP: 143/93 (11-15-19 @ 22:00) (130/77 - 148/80)  RR: 16 (11-15-19 @ 22:00) (14 - 16)  SpO2: 96% (11-15-19 @ 22:00) (96% - 100%)  Wt(kg): --  I&O's Summary    14 Nov 2019 07:01  -  15 Nov 2019 07:00  --------------------------------------------------------  IN: 3690 mL / OUT: 0 mL / NET: 3690 mL    15 Nov 2019 07:01  -  15 Nov 2019 22:11  --------------------------------------------------------  IN: 125 mL / OUT: 90 mL / NET: 35 mL        Gen: Resting comfortably in bed, NAD  CV: S1S2, RRR  Lungs: CTA B/L  Abd: soft, appropriately tender, no guarding/rebound, R. MICHELE in place  Inc: 3 port incisions Clean/dry/intact w/ bandage in place  Ext: SCD's in place and functional, non-tender b/l, no edema        A/P: 44y Female s/p LSC RSO, LS, KARL, Cystoscopy w/ ucath placement/removal, flex sig doing well post operatively.    Neuro: PO Analgesia PRN  CV: Hemodynamically stable.  Monitor VS. CBC in AM.   Pulm: Saturating well on room air.  Encourage OOB and incentive spirometer use.   GI: Advance to regular diet. Anti-emetics PRN.  : Jorgensen to gravity. TOV in AM/DTV by DTV by 330am  FEN: 125cc/hr.  Heme: DVT ppx w/ SCD's while in bed. Early ambulation, initially with assistance then as tolerated.  Continue HSQ   ID: Afebrile  Endo: No active issues   Dispo: Discharge from PACU when criteria met.     Shay Vera PGY-2

## 2019-11-15 NOTE — PROGRESS NOTE ADULT - ASSESSMENT
45yo female admitted for TOA with fever/chills, tachycardia and RLQ abdominal pain since Friday and with CT scan from outside institution (11/11) and TVUS done here (11/12) demonstrating a complex right ovarian mass. Fever, tachycardia resolved. Leukocytosis improved on Cefotetan, Doxy, Flagyl. Patient with worsening RLQ pain overnight.

## 2019-11-15 NOTE — BRIEF OPERATIVE NOTE - NSICDXBRIEFPREOP_GEN_ALL_CORE_FT
PRE-OP DIAGNOSIS:  Tubo-ovarian abscess 15-Nov-2019 17:18:46  Blayne Ford
PRE-OP DIAGNOSIS:  Tubo-ovarian abscess 15-Nov-2019 17:18:46  Blayne Ford

## 2019-11-15 NOTE — BRIEF OPERATIVE NOTE - NSICDXBRIEFPROCEDURE_GEN_ALL_CORE_FT
PROCEDURES:  Insertion, stent, ureter, cystoscopic 15-Nov-2019 17:18:21  Blayne Ford
PROCEDURES:  Diagnostic rigid sigmoidoscopy 15-Nov-2019 20:07:19  Sammi Peña  Cystoscopy 15-Nov-2019 20:06:59  Sammi Peña  Pelviscopic lysis of adhesions 15-Nov-2019 20:06:31  Sammi Peña  Laparoscopic salpingo-oophorectomy, right 15-Nov-2019 20:06:19  Sammi Peña  Laparoscopic left salpingectomy 15-Nov-2019 20:06:09  Sammi Peña  Insertion, stent, ureter, cystoscopic 15-Nov-2019 17:18:21  Blayne Ford

## 2019-11-15 NOTE — PROGRESS NOTE ADULT - PROBLEM SELECTOR PLAN 1
Neuro: c/w PO Tylenol, Motrin, PO Dilaudid PRN  CV: Hemodynamically stable, continue to monitor vitals, AM CBC pending  Pulm: saturating well on room air, continue to increase ambulation  GI: Regular diet, anti-emetics PRN  : Voiding spontaneously   Heme: HSQ and SCDs for DVT ppx  ID: afebrile, non-tachy on Cefotetan, Doxy, Flagyl, Ucx NG, Bcx NGTD, HIV/RPR neg, GC/CT neg. Collection not amenable to drainage per IR. Appreciate ID recs, consider transition to PO antibiotics. F/u quant gold to r/o milliary TB,  FEN: LR@125, will GEM Snell PGY2 Neuro: c/w PO Tylenol, Motrin, PO Dilaudid PRN  CV: Hemodynamically stable, continue to monitor vitals, AM CBC pending  Pulm: saturating well on room air, continue to increase ambulation  GI: Regular diet, anti-emetics PRN  : Voiding spontaneously   Heme: HSQ and SCDs for DVT ppx  ID: afebrile, non-tachy on Cefotetan, Doxy, Flagyl, Ucx NG, Bcx NGTD, HIV/RPR neg, GC/CT neg. Collection not amenable to drainage per IR. Appreciate ID recs,  F/u quant gold to r/o milliary TB,  FEN: LR@125    LISETTE Snell PGY2

## 2019-11-15 NOTE — DISCHARGE NOTE PROVIDER - HOSPITAL COURSE
Patient was admitted with abdominal pain, leukocytosis, tachycardia and fevers with imaging concerning for TOA.  She was started on Cefotetan and Doxycycline, with the addition of Flagyl on HD#1.  On HD#2 vitals stabilized but with increasing leukocytosis and abdominal pain a CT scan was obtained to evaluate for potential IR drainage.  Due to characteristics of the mass on CT decision by IR was made to not perform the drainage at that time.  Analgesic regimen was adjusted and patient continued on IV antibiotics.  On HD#3 patient reported significant improvement in pain.  Due to patient's symptomatic improvement, stabilizing leukocytosis and lack of fevers, goal was to eventually switch over to PO antibiotics to continue conservative treatment prior to discharge.  Upon assessment of patient on HD#4 abdominal pain had worsened, patient reported subjective fevers, and leukocytosis had not improved.  In the context of being on IV antibiotics for 4 days without significant improvement the decision was made to undergo surgical treatment.  Patient underwent laparoscopic left salpingo-oopherectomy with lysis of adhesions and cystoscopic ureteral stent placement for diagnosis of tubo-ovarian abscess.  Patient was stable in the immediate post-operative period.      Patient was discharged on HD#____ and POD#____ in hemodynamically stable condition, voiding spontaneously and ambulating without difficulty. Patient was admitted with abdominal pain, leukocytosis, tachycardia and fevers with imaging concerning for TOA.  She was started on Cefotetan and Doxycycline, with the addition of Flagyl on HD#1.  On HD#2 vitals stabilized but with increasing leukocytosis and abdominal pain a CT scan was obtained to evaluate for potential IR drainage.  Due to characteristics of the mass on CT decision by IR was made to not perform the drainage at that time.  Analgesic regimen was adjusted and patient continued on IV antibiotics.  On HD#3 patient reported significant improvement in pain.  Due to patient's symptomatic improvement, stabilizing leukocytosis and lack of fevers, goal was to eventually switch over to PO antibiotics to continue conservative treatment prior to discharge.  Upon assessment of patient on HD#4 abdominal pain had worsened, patient reported subjective fevers, and leukocytosis had not improved.  In the context of being on IV antibiotics for 4 days without significant improvement the decision was made to undergo surgical treatment.  Patient underwent laparoscopic left salpingo-oopherectomy with lysis of adhesions and cystoscopic ureteral stent placement for diagnosis of tubo-ovarian abscess.  .  Patient was stable in the immediate post-operative period.      Patient was discharged on HD#____ and POD#____ in hemodynamically stable condition, voiding spontaneously and ambulating without difficulty. Patient was admitted with abdominal pain, leukocytosis, tachycardia and fevers with imaging concerning for TOA.  She was started on Cefotetan and Doxycycline, with the addition of Flagyl on HD#1.  On HD#2 vitals stabilized but with increasing leukocytosis and abdominal pain a CT scan was obtained to evaluate for potential IR drainage.  Due to characteristics of the mass on CT decision by IR was made to not perform the drainage at that time.  Analgesic regimen was adjusted and patient continued on IV antibiotics.  On HD#3 patient reported significant improvement in pain.  Due to patient's symptomatic improvement, stabilizing leukocytosis and lack of fevers, goal was to eventually switch over to PO antibiotics to continue conservative treatment prior to discharge.  Upon assessment of patient on HD#4 abdominal pain had worsened, patient reported subjective fevers, and leukocytosis had not improved.  In the context of being on IV antibiotics for 4 days without significant improvement the decision was made to undergo surgical treatment.  Patient underwent laparoscopic right salpingo-oopherectomy, left salpingectomy with lysis of adhesions and cystoscopic ureteral stent placement for diagnosis of tubo-ovarian abscess.  .  Patient was stable in the immediate post-operative period.  Pt with uncomplicated post op course    On the day of discharge the patient is afebrile and hemodynamically stable. She is ambulating without assistance, voiding spontaneously, and tolerating regular diet. Pain is well controlled on oral medication. She is cleared for discharge with instructions for appropriate follow up.    Patient was discharged on HD#7 and POD#3 in hemodynamically stable condition, voiding spontaneously and ambulating without difficulty. Patient was admitted with abdominal pain, leukocytosis, tachycardia and fevers with imaging concerning for TOA.  She was started on Cefotetan and Doxycycline, with the addition of Flagyl on HD#1.  On HD#2 vitals stabilized but with increasing leukocytosis and abdominal pain a CT scan was obtained to evaluate for potential IR drainage.  Due to characteristics of the mass on CT decision by IR was made to not perform the drainage at that time.  Analgesic regimen was adjusted and patient continued on IV antibiotics.  On HD#3 patient reported significant improvement in pain.  Due to patient's symptomatic improvement, stabilizing leukocytosis and lack of fevers, goal was to eventually switch over to PO antibiotics to continue conservative treatment prior to discharge.  Upon assessment of patient on HD#4 abdominal pain had worsened, patient reported subjective fevers, and leukocytosis had not improved.  In the context of being on IV antibiotics for 4 days without significant improvement the decision was made to undergo surgical treatment.  Patient underwent laparoscopic right salpingo-oopherectomy, left salpingectomy with lysis of adhesions and cystoscopic ureteral stent placement for diagnosis of tubo-ovarian abscess.  .  Patient was stable in the immediate post-operative period.  Pt with uncomplicated post op course    On the day of discharge the patient is afebrile and hemodynamically stable. She is ambulating without assistance, voiding spontaneously, and tolerating regular diet. Pain is well controlled on oral medication. She is cleared for discharge with instructions for appropriate follow up.    Pt has a MICHELE drain in place andhas an appointment on friday to have it removed with <10cc output q 24 hours    Patient was discharged on HD#7 and POD#3 in hemodynamically stable condition, voiding spontaneously and ambulating without difficulty.

## 2019-11-15 NOTE — DISCHARGE NOTE PROVIDER - NSDCMRMEDTOKEN_GEN_ALL_CORE_FT
acetaminophen 325 mg oral tablet: 3 tab(s) orally every 6 hours  cefpodoxime 200 mg oral tablet: 1 tab(s) orally 2 times a day   Flagyl 500 mg oral tablet: 1 tab(s) orally 2 times a day   ibuprofen 600 mg oral tablet: 1 tab(s) orally every 6 hours acetaminophen 325 mg oral tablet: 3 tab(s) orally every 6 hours  cefpodoxime 200 mg oral tablet: 1 tab(s) orally 2 times a day   Flagyl 500 mg oral tablet: 1 tab(s) orally 2 times a day   HYDROmorphone 2 mg oral tablet: 1 tab(s) orally every 4 hours, As needed, Severe Pain (7 - 10) MDD:6  ibuprofen 600 mg oral tablet: 1 tab(s) orally every 6 hours

## 2019-11-15 NOTE — DISCHARGE NOTE PROVIDER - NSDCFUADDINST_GEN_ALL_CORE_FT
Regular diet as tolerated, regular activity as tolerated, no heavy lifting for first six weeks.  Take tylenol, motrin, and dilaudid as needed for pain control.  Do not drive while taking narcotics.  Nothing per vagina (ie no tampons, douching, or intercourse) until cleared by your doctor.  Shower only, no baths.  Please make an appointment to see your doctor this friday.  Call your doctor or go to the ED if you have bleeding that does not stop, are unable to urinate, or have a fever >100.4.

## 2019-11-15 NOTE — DISCHARGE NOTE PROVIDER - NSDCCPCAREPLAN_GEN_ALL_CORE_FT
PRINCIPAL DISCHARGE DIAGNOSIS  Diagnosis: TOA (tubo-ovarian abscess)  Assessment and Plan of Treatment: PRINCIPAL DISCHARGE DIAGNOSIS  Diagnosis: S/P unilateral salpingo-oophorectomy  Assessment and Plan of Treatment:

## 2019-11-15 NOTE — PROGRESS NOTE ADULT - SUBJECTIVE AND OBJECTIVE BOX
CC: F/U for TOA    Saw/spoke to patient. No fevers, no chills. No new complaints. Still abd pain. Planned for OR today.    Allergies  No Known Allergies    ANTIMICROBIALS:  cefoTEtan  IVPB    cefoTEtan  IVPB 2 every 12 hours  doxycycline IVPB 100 every 12 hours  metroNIDAZOLE  IVPB 500 every 12 hours    PE:    Vital Signs Last 24 Hrs  T(C): 36.8 (15 Nov 2019 06:41), Max: 37.1 (14 Nov 2019 21:08)  T(F): 98.3 (15 Nov 2019 06:41), Max: 98.8 (14 Nov 2019 21:08)  HR: 80 (15 Nov 2019 10:44) (80 - 99)  BP: 123/85 (15 Nov 2019 10:44) (123/85 - 141/92)  RR: 17 (15 Nov 2019 10:44) (17 - 18)  SpO2: 97% (15 Nov 2019 10:44) (95% - 100%)    Gen: AOx3, NAD, non-toxic, pleasant  CV: S1+S2 normal, nontachycardic  Resp: Clear bilat, no resp distress, no crackles/wheezes  Abd: Soft, nontender, +BS  Ext: No LE edema, no wounds    LABS:                        8.1    21.09 )-----------( 351      ( 15 Nov 2019 07:22 )             25.2     11-15    141  |  103  |  8   ----------------------------<  101<H>  4.0   |  24  |  0.54    Ca    8.9      15 Nov 2019 07:12    MICROBIOLOGY:    .Urine Clean Catch (Midstream)  11-12-19   No growth    .Blood Blood-Peripheral  11-11-19   No growth to date.     RADIOLOGY:    11/13 CT:    A 7.1 cm complex cystic right adnexal mass with internal calcifications   and regional inflammatory change, compatible with provided history of   tubo-ovarian abscess (TOA). Calcifications may be seen in the setting of   tuberculous TOA. Follow-up to resolution recommended to exclude   underlying mass.    Mildly enlarged retroperitoneal lymph nodes.

## 2019-11-15 NOTE — DISCHARGE NOTE PROVIDER - NSDCCPTREATMENT_GEN_ALL_CORE_FT
PRINCIPAL PROCEDURE  Procedure: Left salpingo-oophorectomy  Findings and Treatment:       SECONDARY PROCEDURE  Procedure: Placement of ureteral stent  Findings and Treatment: PRINCIPAL PROCEDURE  Procedure: Right salpingo-oophorectomy  Findings and Treatment:       SECONDARY PROCEDURE  Procedure: Placement of ureteral stent  Findings and Treatment:

## 2019-11-15 NOTE — DISCHARGE NOTE PROVIDER - CARE PROVIDER_API CALL
Jaycob Cardona)  Obstetrics and Gynecology  [address] [place] [address], [address]  [place] [address]  Phone: [phone]  Fax: [phone]  Follow Up Time:

## 2019-11-16 DIAGNOSIS — Z98.890 OTHER SPECIFIED POSTPROCEDURAL STATES: ICD-10-CM

## 2019-11-16 LAB
CULTURE RESULTS: SIGNIFICANT CHANGE UP
CULTURE RESULTS: SIGNIFICANT CHANGE UP
HCT VFR BLD CALC: 25.6 % — LOW (ref 34.5–45)
HGB BLD-MCNC: 8.2 G/DL — LOW (ref 11.5–15.5)
MCHC RBC-ENTMCNC: 21.4 PG — LOW (ref 27–34)
MCHC RBC-ENTMCNC: 32 GM/DL — SIGNIFICANT CHANGE UP (ref 32–36)
MCV RBC AUTO: 66.7 FL — LOW (ref 80–100)
NRBC # BLD: 0 /100 WBCS — SIGNIFICANT CHANGE UP (ref 0–0)
PLATELET # BLD AUTO: 435 K/UL — HIGH (ref 150–400)
RBC # BLD: 3.84 M/UL — SIGNIFICANT CHANGE UP (ref 3.8–5.2)
RBC # FLD: 16.8 % — HIGH (ref 10.3–14.5)
SPECIMEN SOURCE: SIGNIFICANT CHANGE UP
SPECIMEN SOURCE: SIGNIFICANT CHANGE UP
WBC # BLD: 20.58 K/UL — HIGH (ref 3.8–10.5)
WBC # FLD AUTO: 20.58 K/UL — HIGH (ref 3.8–10.5)

## 2019-11-16 RX ORDER — KETOROLAC TROMETHAMINE 30 MG/ML
30 SYRINGE (ML) INJECTION ONCE
Refills: 0 | Status: DISCONTINUED | OUTPATIENT
Start: 2019-11-16 | End: 2019-11-16

## 2019-11-16 RX ADMIN — Medication 975 MILLIGRAM(S): at 12:30

## 2019-11-16 RX ADMIN — Medication 600 MILLIGRAM(S): at 06:15

## 2019-11-16 RX ADMIN — ERTAPENEM SODIUM 120 MILLIGRAM(S): 1 INJECTION, POWDER, LYOPHILIZED, FOR SOLUTION INTRAMUSCULAR; INTRAVENOUS at 22:01

## 2019-11-16 RX ADMIN — Medication 600 MILLIGRAM(S): at 00:58

## 2019-11-16 RX ADMIN — Medication 975 MILLIGRAM(S): at 06:14

## 2019-11-16 RX ADMIN — Medication 975 MILLIGRAM(S): at 23:03

## 2019-11-16 RX ADMIN — Medication 30 MILLIGRAM(S): at 02:21

## 2019-11-16 RX ADMIN — Medication 600 MILLIGRAM(S): at 12:30

## 2019-11-16 RX ADMIN — Medication 975 MILLIGRAM(S): at 07:46

## 2019-11-16 RX ADMIN — Medication 600 MILLIGRAM(S): at 07:46

## 2019-11-16 RX ADMIN — POLYETHYLENE GLYCOL 3350 17 GRAM(S): 17 POWDER, FOR SOLUTION ORAL at 12:30

## 2019-11-16 RX ADMIN — HYDROMORPHONE HYDROCHLORIDE 2 MILLIGRAM(S): 2 INJECTION INTRAMUSCULAR; INTRAVENOUS; SUBCUTANEOUS at 10:06

## 2019-11-16 RX ADMIN — Medication 600 MILLIGRAM(S): at 17:16

## 2019-11-16 RX ADMIN — HYDROMORPHONE HYDROCHLORIDE 2 MILLIGRAM(S): 2 INJECTION INTRAMUSCULAR; INTRAVENOUS; SUBCUTANEOUS at 04:13

## 2019-11-16 RX ADMIN — HEPARIN SODIUM 5000 UNIT(S): 5000 INJECTION INTRAVENOUS; SUBCUTANEOUS at 17:16

## 2019-11-16 RX ADMIN — HEPARIN SODIUM 5000 UNIT(S): 5000 INJECTION INTRAVENOUS; SUBCUTANEOUS at 06:14

## 2019-11-16 RX ADMIN — HYDROMORPHONE HYDROCHLORIDE 2 MILLIGRAM(S): 2 INJECTION INTRAMUSCULAR; INTRAVENOUS; SUBCUTANEOUS at 05:00

## 2019-11-16 RX ADMIN — SIMETHICONE 80 MILLIGRAM(S): 80 TABLET, CHEWABLE ORAL at 20:27

## 2019-11-16 RX ADMIN — HYDROMORPHONE HYDROCHLORIDE 2 MILLIGRAM(S): 2 INJECTION INTRAMUSCULAR; INTRAVENOUS; SUBCUTANEOUS at 09:06

## 2019-11-16 RX ADMIN — Medication 975 MILLIGRAM(S): at 17:16

## 2019-11-16 RX ADMIN — Medication 600 MILLIGRAM(S): at 23:02

## 2019-11-16 RX ADMIN — Medication 30 MILLIGRAM(S): at 04:11

## 2019-11-16 RX ADMIN — HYDROMORPHONE HYDROCHLORIDE 2 MILLIGRAM(S): 2 INJECTION INTRAMUSCULAR; INTRAVENOUS; SUBCUTANEOUS at 01:01

## 2019-11-16 RX ADMIN — Medication 975 MILLIGRAM(S): at 00:57

## 2019-11-16 RX ADMIN — HYDROMORPHONE HYDROCHLORIDE 2 MILLIGRAM(S): 2 INJECTION INTRAMUSCULAR; INTRAVENOUS; SUBCUTANEOUS at 02:00

## 2019-11-16 RX ADMIN — SIMETHICONE 80 MILLIGRAM(S): 80 TABLET, CHEWABLE ORAL at 09:06

## 2019-11-16 NOTE — PROGRESS NOTE ADULT - SUBJECTIVE AND OBJECTIVE BOX
GYN Progress Note    POD#1   HD#5    Patient seen and examined at bedside. No acute events overnight. No acute complaints.  Pain well controlled. Patient is ambulating and tolerating a regular diet. Patient is passing flatus and voiding spontaneously. Denies CP, SOB, N/V, fevers, and chills. Patient feels significantly improved post-op and reports complete resolution of subjective fevers, chills, and metallic taste in her mouth.     Vital Signs Last 24 Hours  T(C): 36.5 (11-16-19 @ 06:38), Max: 38.7 (11-15-19 @ 13:57)  HR: 99 (11-16-19 @ 06:38) (80 - 110)  BP: 118/81 (11-16-19 @ 06:38) (118/81 - 148/80)  RR: 18 (11-16-19 @ 06:38) (14 - 18)  SpO2: 95% (11-16-19 @ 06:38) (93% - 100%)    I&O's Summary    15 Nov 2019 07:01  -  16 Nov 2019 07:00  --------------------------------------------------------  IN: 370 mL / OUT: 160 mL / NET: 210 mL        Physical Exam:  General: NAD  CV: RRR  Lungs: CTAB  Abdomen: soft, appropriately-tender, softly distended, hypoactive bowel sounds  Incision: laparoscopic incisions C/D/I  : no bleeding on pad  Ext: no pain or swelling     Labs:                        8.1    21.09 )-----------( 351      ( 15 Nov 2019 07:22 )             25.2   baso x      eos x      imm gran x      lymph x      mono x      poly x                            7.7    21.76 )-----------( 329      ( 14 Nov 2019 07:20 )             24.0   baso x      eos x      imm gran x      lymph x      mono x      poly x                            8.6    24.02 )-----------( 361      ( 13 Nov 2019 07:21 )             27.8   baso x      eos x      imm gran x      lymph x      mono x      poly x          MEDICATIONS  (STANDING):  acetaminophen   Tablet .. 975 milliGRAM(s) Oral every 6 hours  ertapenem  IVPB 1000 milliGRAM(s) IV Intermittent every 24 hours  heparin  Injectable 5000 Unit(s) SubCutaneous every 12 hours  ibuprofen  Tablet. 600 milliGRAM(s) Oral every 6 hours  polyethylene glycol 3350 17 Gram(s) Oral daily  simethicone 80 milliGRAM(s) Chew five times a day    MEDICATIONS  (PRN):  HYDROmorphone   Tablet 2 milliGRAM(s) Oral every 4 hours PRN Severe Pain (7 - 10)

## 2019-11-16 NOTE — PROGRESS NOTE ADULT - ASSESSMENT
43yo female admitted with TOA not responsive to 4 days broad spectrum antibiotics now POD#1 from dx LSC, Right Salpingo-Oophorectomy, LS, KARL, cysto, u-cath placement, flex sigmoidoscopy. Patient progressing appropriately in the post-operative period.

## 2019-11-16 NOTE — PROGRESS NOTE ADULT - PROBLEM SELECTOR PLAN 1
Neuro: c/w PO Tylenol, Motrin, PO Dilaudid PRN  CV: Hemodynamically stable, continue to monitor vitals, AM CBC pending  Pulm: saturating well on room air, continue to increase ambulation  GI: Regular diet, anti-emetics PRN  : Voiding spontaneously   Heme: HSQ and SCDs for DVT ppx  ID: afebrile, non-tachy overnight, c/w Invanz today, Ucx NG, Bcx NGTD, HIV/RPR neg, GC/CT neg. QuantGold negative. Appreciate ID recs  FEN: SLIV  Dispo: continue inpatient care    LISETTE Snell PGY2

## 2019-11-17 LAB
HCT VFR BLD CALC: 24.2 % — LOW (ref 34.5–45)
HGB BLD-MCNC: 7.7 G/DL — LOW (ref 11.5–15.5)
MCHC RBC-ENTMCNC: 21.6 PG — LOW (ref 27–34)
MCHC RBC-ENTMCNC: 31.8 GM/DL — LOW (ref 32–36)
MCV RBC AUTO: 67.8 FL — LOW (ref 80–100)
NRBC # BLD: 0 /100 WBCS — SIGNIFICANT CHANGE UP (ref 0–0)
PLATELET # BLD AUTO: 438 K/UL — HIGH (ref 150–400)
RBC # BLD: 3.57 M/UL — LOW (ref 3.8–5.2)
RBC # FLD: 16.8 % — HIGH (ref 10.3–14.5)
WBC # BLD: 19.42 K/UL — HIGH (ref 3.8–10.5)
WBC # FLD AUTO: 19.42 K/UL — HIGH (ref 3.8–10.5)

## 2019-11-17 RX ORDER — ERTAPENEM SODIUM 1 G/1
1000 INJECTION, POWDER, LYOPHILIZED, FOR SOLUTION INTRAMUSCULAR; INTRAVENOUS EVERY 24 HOURS
Refills: 0 | Status: DISCONTINUED | OUTPATIENT
Start: 2019-11-17 | End: 2019-11-18

## 2019-11-17 RX ADMIN — Medication 600 MILLIGRAM(S): at 22:46

## 2019-11-17 RX ADMIN — Medication 975 MILLIGRAM(S): at 17:57

## 2019-11-17 RX ADMIN — Medication 600 MILLIGRAM(S): at 07:38

## 2019-11-17 RX ADMIN — HYDROMORPHONE HYDROCHLORIDE 2 MILLIGRAM(S): 2 INJECTION INTRAMUSCULAR; INTRAVENOUS; SUBCUTANEOUS at 09:04

## 2019-11-17 RX ADMIN — Medication 975 MILLIGRAM(S): at 00:30

## 2019-11-17 RX ADMIN — HEPARIN SODIUM 5000 UNIT(S): 5000 INJECTION INTRAVENOUS; SUBCUTANEOUS at 17:57

## 2019-11-17 RX ADMIN — Medication 600 MILLIGRAM(S): at 05:01

## 2019-11-17 RX ADMIN — Medication 600 MILLIGRAM(S): at 14:18

## 2019-11-17 RX ADMIN — Medication 975 MILLIGRAM(S): at 07:38

## 2019-11-17 RX ADMIN — Medication 600 MILLIGRAM(S): at 00:30

## 2019-11-17 RX ADMIN — HYDROMORPHONE HYDROCHLORIDE 2 MILLIGRAM(S): 2 INJECTION INTRAMUSCULAR; INTRAVENOUS; SUBCUTANEOUS at 14:16

## 2019-11-17 RX ADMIN — Medication 975 MILLIGRAM(S): at 05:01

## 2019-11-17 RX ADMIN — ERTAPENEM SODIUM 120 MILLIGRAM(S): 1 INJECTION, POWDER, LYOPHILIZED, FOR SOLUTION INTRAMUSCULAR; INTRAVENOUS at 22:05

## 2019-11-17 RX ADMIN — SIMETHICONE 80 MILLIGRAM(S): 80 TABLET, CHEWABLE ORAL at 17:57

## 2019-11-17 RX ADMIN — SIMETHICONE 80 MILLIGRAM(S): 80 TABLET, CHEWABLE ORAL at 22:06

## 2019-11-17 RX ADMIN — SIMETHICONE 80 MILLIGRAM(S): 80 TABLET, CHEWABLE ORAL at 09:05

## 2019-11-17 RX ADMIN — HYDROMORPHONE HYDROCHLORIDE 2 MILLIGRAM(S): 2 INJECTION INTRAMUSCULAR; INTRAVENOUS; SUBCUTANEOUS at 14:46

## 2019-11-17 RX ADMIN — Medication 600 MILLIGRAM(S): at 22:06

## 2019-11-17 RX ADMIN — SIMETHICONE 80 MILLIGRAM(S): 80 TABLET, CHEWABLE ORAL at 14:14

## 2019-11-17 RX ADMIN — HEPARIN SODIUM 5000 UNIT(S): 5000 INJECTION INTRAVENOUS; SUBCUTANEOUS at 05:03

## 2019-11-17 NOTE — PROGRESS NOTE ADULT - ASSESSMENT
43yo female admitted with TOA not responsive to 4 days broad spectrum antibiotics now POD#2 from dx LSC, Right Salpingo-Oophorectomy, LS, KARL, cysto, u-cath placement, flex sigmoidoscopy. Patient progressing appropriately in the post-operative period.

## 2019-11-17 NOTE — PROGRESS NOTE ADULT - SUBJECTIVE AND OBJECTIVE BOX
GYN Progress Note    POD#2   HD#6    Patient seen and examined at bedside.  No acute events overnight. No acute complaints.  Pain well controlled. Patient is ambulating and tolerating a regular diet. Patient is passing flatus and voiding spontaneously. Denies CP, SOB, N/V, fevers, and chills.    Vital Signs Last 24 Hours  T(C): 36.9 (11-17-19 @ 05:55), Max: 37.1 (11-16-19 @ 22:24)  HR: 93 (11-17-19 @ 05:55) (79 - 94)  BP: 141/86 (11-17-19 @ 05:55) (114/76 - 141/86)  RR: 18 (11-17-19 @ 05:55) (16 - 18)  SpO2: 92% (11-17-19 @ 05:55) (92% - 100%)    I&O's Summary    16 Nov 2019 07:01  -  17 Nov 2019 07:00  --------------------------------------------------------  IN: 1550 mL / OUT: 45 mL / NET: 1505 mL      Physical Exam:  General: NAD  CV: RRR  Lungs: CTAB  Abdomen: soft, appropriately-tender, softly distended, normoactive bowel sounds  Incision: laparoscopic incisions C/D/I, RLQ MICHELE with minima serous fluid  : no bleeding on pad  Ext: no pain or swelling     Labs:                        8.2    20.58 )-----------( 435      ( 16 Nov 2019 08:49 )             25.6   baso x      eos x      imm gran x      lymph x      mono x      poly x                            8.1    21.09 )-----------( 351      ( 15 Nov 2019 07:22 )             25.2   baso x      eos x      imm gran x      lymph x      mono x      poly x          MEDICATIONS  (STANDING):  acetaminophen   Tablet .. 975 milliGRAM(s) Oral every 6 hours  heparin  Injectable 5000 Unit(s) SubCutaneous every 12 hours  ibuprofen  Tablet. 600 milliGRAM(s) Oral every 6 hours  polyethylene glycol 3350 17 Gram(s) Oral daily  simethicone 80 milliGRAM(s) Chew five times a day    MEDICATIONS  (PRN):  HYDROmorphone   Tablet 2 milliGRAM(s) Oral every 4 hours PRN Severe Pain (7 - 10)

## 2019-11-17 NOTE — PROGRESS NOTE ADULT - PROBLEM SELECTOR PLAN 1
Neuro: c/w PO Tylenol, Motrin, PO Dilaudid PRN  CV: Hemodynamically stable, continue to monitor vitals, AM CBC pending  Pulm: saturating well on room air, continue to increase ambulation  GI: Regular diet, anti-emetics PRN  : Voiding spontaneously   Heme: HSQ and SCDs for DVT ppx  ID: VSS, Ucx NG, Bcx NGTD, HIV/RPR neg, GC/CT neg. QuantGold negative. Transition to PO abx today, appreciate ID recs  FEN: SLIV  Dispo: continue inpatient care    LISETTE Snell PGY2

## 2019-11-18 ENCOUNTER — TRANSCRIPTION ENCOUNTER (OUTPATIENT)
Age: 44
End: 2019-11-18

## 2019-11-18 VITALS
TEMPERATURE: 98 F | DIASTOLIC BLOOD PRESSURE: 85 MMHG | HEART RATE: 99 BPM | RESPIRATION RATE: 18 BRPM | SYSTOLIC BLOOD PRESSURE: 133 MMHG | OXYGEN SATURATION: 99 %

## 2019-11-18 PROBLEM — E78.5 HYPERLIPIDEMIA, UNSPECIFIED: Chronic | Status: ACTIVE | Noted: 2019-11-11

## 2019-11-18 LAB
HCT VFR BLD CALC: 22.9 % — LOW (ref 34.5–45)
HGB BLD-MCNC: 7.1 G/DL — LOW (ref 11.5–15.5)
MCHC RBC-ENTMCNC: 20.9 PG — LOW (ref 27–34)
MCHC RBC-ENTMCNC: 31 GM/DL — LOW (ref 32–36)
MCV RBC AUTO: 67.6 FL — LOW (ref 80–100)
NRBC # BLD: 0 /100 WBCS — SIGNIFICANT CHANGE UP (ref 0–0)
PLATELET # BLD AUTO: 489 K/UL — HIGH (ref 150–400)
RBC # BLD: 3.39 M/UL — LOW (ref 3.8–5.2)
RBC # FLD: 16.6 % — HIGH (ref 10.3–14.5)
WBC # BLD: 15.04 K/UL — HIGH (ref 3.8–10.5)
WBC # FLD AUTO: 15.04 K/UL — HIGH (ref 3.8–10.5)

## 2019-11-18 PROCEDURE — 81025 URINE PREGNANCY TEST: CPT

## 2019-11-18 PROCEDURE — 99233 SBSQ HOSP IP/OBS HIGH 50: CPT

## 2019-11-18 PROCEDURE — 96375 TX/PRO/DX INJ NEW DRUG ADDON: CPT

## 2019-11-18 PROCEDURE — 81003 URINALYSIS AUTO W/O SCOPE: CPT

## 2019-11-18 PROCEDURE — 99285 EMERGENCY DEPT VISIT HI MDM: CPT | Mod: 25

## 2019-11-18 PROCEDURE — 87075 CULTR BACTERIA EXCEPT BLOOD: CPT

## 2019-11-18 PROCEDURE — 74177 CT ABD & PELVIS W/CONTRAST: CPT

## 2019-11-18 PROCEDURE — 87491 CHLMYD TRACH DNA AMP PROBE: CPT

## 2019-11-18 PROCEDURE — 83605 ASSAY OF LACTIC ACID: CPT

## 2019-11-18 PROCEDURE — 86901 BLOOD TYPING SEROLOGIC RH(D): CPT

## 2019-11-18 PROCEDURE — 87591 N.GONORRHOEAE DNA AMP PROB: CPT

## 2019-11-18 PROCEDURE — 85730 THROMBOPLASTIN TIME PARTIAL: CPT

## 2019-11-18 PROCEDURE — 87389 HIV-1 AG W/HIV-1&-2 AB AG IA: CPT

## 2019-11-18 PROCEDURE — 85610 PROTHROMBIN TIME: CPT

## 2019-11-18 PROCEDURE — 80053 COMPREHEN METABOLIC PANEL: CPT

## 2019-11-18 PROCEDURE — 86900 BLOOD TYPING SEROLOGIC ABO: CPT

## 2019-11-18 PROCEDURE — 88305 TISSUE EXAM BY PATHOLOGIST: CPT

## 2019-11-18 PROCEDURE — C1889: CPT

## 2019-11-18 PROCEDURE — 96376 TX/PRO/DX INJ SAME DRUG ADON: CPT

## 2019-11-18 PROCEDURE — 87205 SMEAR GRAM STAIN: CPT

## 2019-11-18 PROCEDURE — 88112 CYTOPATH CELL ENHANCE TECH: CPT

## 2019-11-18 PROCEDURE — 86480 TB TEST CELL IMMUN MEASURE: CPT

## 2019-11-18 PROCEDURE — 93975 VASCULAR STUDY: CPT

## 2019-11-18 PROCEDURE — 82962 GLUCOSE BLOOD TEST: CPT

## 2019-11-18 PROCEDURE — 76830 TRANSVAGINAL US NON-OB: CPT

## 2019-11-18 PROCEDURE — 71045 X-RAY EXAM CHEST 1 VIEW: CPT

## 2019-11-18 PROCEDURE — 84702 CHORIONIC GONADOTROPIN TEST: CPT

## 2019-11-18 PROCEDURE — 88302 TISSUE EXAM BY PATHOLOGIST: CPT

## 2019-11-18 PROCEDURE — 96367 TX/PROPH/DG ADDL SEQ IV INF: CPT

## 2019-11-18 PROCEDURE — 85027 COMPLETE CBC AUTOMATED: CPT

## 2019-11-18 PROCEDURE — 93005 ELECTROCARDIOGRAM TRACING: CPT

## 2019-11-18 PROCEDURE — 87070 CULTURE OTHR SPECIMN AEROBIC: CPT

## 2019-11-18 PROCEDURE — 87086 URINE CULTURE/COLONY COUNT: CPT

## 2019-11-18 PROCEDURE — 96365 THER/PROPH/DIAG IV INF INIT: CPT

## 2019-11-18 PROCEDURE — 86850 RBC ANTIBODY SCREEN: CPT

## 2019-11-18 PROCEDURE — 80048 BASIC METABOLIC PNL TOTAL CA: CPT

## 2019-11-18 PROCEDURE — 87040 BLOOD CULTURE FOR BACTERIA: CPT

## 2019-11-18 PROCEDURE — C1758: CPT

## 2019-11-18 PROCEDURE — 86780 TREPONEMA PALLIDUM: CPT

## 2019-11-18 RX ORDER — METRONIDAZOLE 500 MG
1 TABLET ORAL
Qty: 14 | Refills: 0
Start: 2019-11-18 | End: 2019-11-24

## 2019-11-18 RX ORDER — HYDROMORPHONE HYDROCHLORIDE 2 MG/ML
1 INJECTION INTRAMUSCULAR; INTRAVENOUS; SUBCUTANEOUS
Qty: 15 | Refills: 0
Start: 2019-11-18

## 2019-11-18 RX ORDER — IBUPROFEN 200 MG
1 TABLET ORAL
Qty: 0 | Refills: 0 | DISCHARGE
Start: 2019-11-18

## 2019-11-18 RX ORDER — ACETAMINOPHEN 500 MG
3 TABLET ORAL
Qty: 0 | Refills: 0 | DISCHARGE
Start: 2019-11-18

## 2019-11-18 RX ORDER — CEFPODOXIME PROXETIL 100 MG
1 TABLET ORAL
Qty: 14 | Refills: 0
Start: 2019-11-18 | End: 2019-11-24

## 2019-11-18 RX ORDER — BENZOCAINE AND MENTHOL 5; 1 G/100ML; G/100ML
1 LIQUID ORAL
Refills: 0 | Status: DISCONTINUED | OUTPATIENT
Start: 2019-11-18 | End: 2019-11-18

## 2019-11-18 RX ADMIN — SIMETHICONE 80 MILLIGRAM(S): 80 TABLET, CHEWABLE ORAL at 16:30

## 2019-11-18 RX ADMIN — SIMETHICONE 80 MILLIGRAM(S): 80 TABLET, CHEWABLE ORAL at 02:07

## 2019-11-18 RX ADMIN — Medication 975 MILLIGRAM(S): at 08:53

## 2019-11-18 RX ADMIN — Medication 600 MILLIGRAM(S): at 05:45

## 2019-11-18 RX ADMIN — Medication 600 MILLIGRAM(S): at 04:59

## 2019-11-18 RX ADMIN — BENZOCAINE AND MENTHOL 1 LOZENGE: 5; 1 LIQUID ORAL at 02:04

## 2019-11-18 RX ADMIN — Medication 600 MILLIGRAM(S): at 13:13

## 2019-11-18 RX ADMIN — SIMETHICONE 80 MILLIGRAM(S): 80 TABLET, CHEWABLE ORAL at 08:53

## 2019-11-18 RX ADMIN — BENZOCAINE AND MENTHOL 1 LOZENGE: 5; 1 LIQUID ORAL at 13:15

## 2019-11-18 RX ADMIN — SIMETHICONE 80 MILLIGRAM(S): 80 TABLET, CHEWABLE ORAL at 13:14

## 2019-11-18 RX ADMIN — Medication 975 MILLIGRAM(S): at 02:36

## 2019-11-18 RX ADMIN — Medication 975 MILLIGRAM(S): at 16:28

## 2019-11-18 RX ADMIN — Medication 975 MILLIGRAM(S): at 02:04

## 2019-11-18 RX ADMIN — HEPARIN SODIUM 5000 UNIT(S): 5000 INJECTION INTRAVENOUS; SUBCUTANEOUS at 05:00

## 2019-11-18 RX ADMIN — BENZOCAINE AND MENTHOL 1 LOZENGE: 5; 1 LIQUID ORAL at 05:00

## 2019-11-18 RX ADMIN — BENZOCAINE AND MENTHOL 1 LOZENGE: 5; 1 LIQUID ORAL at 08:54

## 2019-11-18 RX ADMIN — BENZOCAINE AND MENTHOL 1 LOZENGE: 5; 1 LIQUID ORAL at 16:28

## 2019-11-18 NOTE — PROGRESS NOTE ADULT - ASSESSMENT
A/P: 43yo female admitted with TOA not responsive to 4 days broad spectrum antibiotics now POD#3 from dx LSC, Right Salpingo-Oophorectomy, LS, KARL, cysto, u-cath placement, flex sigmoidoscopy. Patient progressing appropriately in the post-operative period.

## 2019-11-18 NOTE — DISCHARGE NOTE NURSING/CASE MANAGEMENT/SOCIAL WORK - NSDCPNINST_GEN_ALL_CORE
Call MD or go to ER if severe abdominal pain not relieved with pain meds, nausea, vomiting, fever, signs and symptoms of infections.

## 2019-11-18 NOTE — PROGRESS NOTE ADULT - ATTENDING COMMENTS
I have personally seen and examined the patient and agree with the above stated assessment and plan.    Patient seen and examined by the bedside, complaining of increased pain from yesterday, however declining oxycodone at this time 2/2 to nausea. WBC elevated today from 20-->24. Plan to obtain CT AP with potential IR drainage--called for consult this AM. ID team  agrees with current antibiotic regimen. Continue monitoring vital signs and daily CBC. Has been afebrile since admission with stable vitals.   - If amenable for drainage, will keep NPO overnight  - if not amenable for drainage and clinical picture worsens, consider definitive operative treatment this week    Nery Gomes MD
Patient seen at bedside, agree with above assessment, denies CP, SOB, N, V, leukocyotsis improving, will get ID reccs about PO abx for discharge. Patient abdomen soft, NT, ND. will reassess this PM, if MICHELE drain<10cc will pull drain and send patient home on PO abx. All questions answered, patient in agreement with plan of care.
Patient seen at bedside, agree with above assessment, recovering well after surgery, denies CP, SOB, N, V, cont INvanz today, will continue to trend CBC, cont HSQ, encourage ambulation, regular diet, incentive spirometry, all questions answered
Patient seen at bedside, states she feels well, denies CP, SOB, N, V, abd pain, inc c/d/i. WBC slowly downtrending, appreciate ID recs, all questions answered. cont HSQ, patient encouraged ambulation
I have personally seen and examined the patient and agree with the above stated assessment and plan.    Patient admitted for suspected right sided TOA. Continue with cefotetan, doxy and add flagyl. Follow up blood cultures, continue to monitor daily CBC. Obtain CT from outside institution. No need for additional imaging at this time unless patient not improving.       Nery Gomes MD
Patient seen at bedside, states she still has chills, subjective fevers, RLQ pain, WBC not improving today despite 4 days IV broad spectrum antibiotics, patient abdomen soft, RLQ tenderness on deep palpation, CT reviewed, decision made to proceed with surgery due to persistent leukocytosis and symptoms despite IV abx, patient counseled at length about procedure. understands plan for surgery is laparoscopic Right Salpingo-Oophorectomy vs right salpingectomy, left salpingectomy, possible left oophorectomy, possible TLHBS, vs TLHBSO, possible laparotomy TAHBSO, patient understands risks of procedure including but not limited to VTE, bleeding, infection, damage to bowel/bladder/ ureter. Understands and all questions answered. Denies any hx of abnormal pap smears, and denies any problem with menses/heavy menses/ AUB. Patient seen at bedside with Dr Cardona, in agreement with plan. NPO, added on to OR
I have personally seen and examined the patient and agree with the above stated assessment and plan.    Patient clinically improving, pain resolving and denies nausea/vomiting/bloating. WBC downtrending. Continue with IV abx for today and consider transition to PO tomorrow AM. If remains afebrile on PO abx with continued downtrending WBC, consider DC home on Saturday.    KIMBERLEE Mayer Fellow

## 2019-11-18 NOTE — PROGRESS NOTE ADULT - SUBJECTIVE AND OBJECTIVE BOX
CC: F/U for TOA    Saw/spoke to patient. No fevers, no chills. No new complaints. S/P TOA. Patient feels improved.    Allergies  No Known Allergies    ANTIMICROBIALS:  ertapenem  IVPB 1000 every 24 hours    PE:    Vital Signs Last 24 Hrs  T(C): 36.5 (18 Nov 2019 09:16), Max: 37.1 (17 Nov 2019 21:38)  T(F): 97.7 (18 Nov 2019 09:16), Max: 98.8 (17 Nov 2019 21:38)  HR: 74 (18 Nov 2019 09:16) (74 - 98)  BP: 154/95 (18 Nov 2019 09:16) (124/82 - 154/95)  RR: 18 (18 Nov 2019 09:16) (18 - 18)  SpO2: 100% (18 Nov 2019 09:16) (96% - 100%)    Gen: AOx3, NAD, non-toxic, pleasant  CV: S1+S2 normal, nontachycardic  Resp: Clear bilat, no resp distress, no crackles/wheezes  Abd: Soft, nontender, +BS, drain  Ext: No LE edema, no wounds    LABS:                        7.1    15.04 )-----------( 489      ( 18 Nov 2019 05:24 )             22.9     MICROBIOLOGY:    .Abscess pelvic abscess for culture  11-16-19   No growth    .Urine Clean Catch (Midstream)  11-12-19   No growth    .Blood Blood-Peripheral  11-11-19   No growth at 5 days.     (otherwise reviewed)    RADIOLOGY:    11/13 CT:    IMPRESSION:     A 7.1 cm complex cystic right adnexal mass with internal calcifications   and regional inflammatory change, compatible with provided history of   tubo-ovarian abscess (TOA). Calcifications may be seen in the setting of   tuberculous TOA. Follow-up to resolution recommended to exclude   underlying mass.    Mildly enlarged retroperitoneal lymph nodes.

## 2019-11-18 NOTE — DISCHARGE NOTE NURSING/CASE MANAGEMENT/SOCIAL WORK - PATIENT PORTAL LINK FT
You can access the FollowMyHealth Patient Portal offered by [facility] by registering at the following website: [email or web address]. By joining [employer]’s FollowMyHealth portal, you will also be able to view your health information using other applications (apps) compatible with our system.

## 2019-11-18 NOTE — PROGRESS NOTE ADULT - ASSESSMENT
43 yo  LMP 10/25 presenting w/ fever/chills since Friday and abdominal pain.  Fever, leukocytosis  USG with R sided ovarian mass/lesion  CT consistent with TOA, although calcifications noted  Still leukocytosis, s/p OR had drainage, R salpingo oopohrectomy, L salpingectomy  Clinically improved today with decreasing leukocytosis  OR culture NGTD  Overall, TOA, fever, leukocytosis, sepsis  - Ertapenem 1g q 24  - When DC planning, can change to Vantin 200mg q 12, Flagyl 500mg q 12, to complete 10 day course from OR  - F/U OBGYN  - F/U in ID clinic in 2 weeks, patient given my card to call PRN  - F/U pending cultures    Shay Hartley MD  Pager [phone]  After 5pm and on weekends call [phone] 43 yo  LMP 10/25 presenting w/ fever/chills since Friday and abdominal pain.  Fever, leukocytosis  USG with R sided ovarian mass/lesion  CT consistent with TOA, although calcifications noted  Still leukocytosis, s/p OR had drainage, R salpingo oopohrectomy, L salpingectomy  Clinically improved today with decreasing leukocytosis  OR culture NGTD  Overall, TOA, fever, leukocytosis, sepsis  - Ertapenem 1g q 24  - When DC planning, can change to Vantin 200mg q 12, Flagyl 500mg q 12, to complete 10 day course from OR  - F/U OBGYN  - F/U in ID clinic in 2 weeks, patient given my card to call PRN  - F/U pending cultures  - Discussed with OBGYN team    Shay Hartley MD  Pager [phone]  After 5pm and on weekends call [phone]

## 2019-11-18 NOTE — PROGRESS NOTE ADULT - SUBJECTIVE AND OBJECTIVE BOX
R1 OBGYN Progress Note    POD#3   HD#7    Patient seen and examined at bedside.  No acute events overnight. No acute complaints.  Pain well controlled.  Patient is ambulating and tolerating a regular diet. Patient is passing flatus. Patient is voiding spontaneously. Denies CP, SOB, N/V, fevers, and chills.    Vital Signs Last 24 Hours  T(C): 36.8 (11-18-19 @ 05:00), Max: 37.1 (11-17-19 @ 21:38)  HR: 88 (11-18-19 @ 05:00) (76 - 98)  BP: 133/86 (11-18-19 @ 05:00) (124/82 - 146/94)  RR: 18 (11-18-19 @ 05:00) (18 - 18)  SpO2: 96% (11-18-19 @ 05:00) (96% - 99%)    I&O's Summary    17 Nov 2019 07:01  -  18 Nov 2019 07:00  --------------------------------------------------------  IN: 1020 mL / OUT: 65 mL / NET: 955 mL        Physical Exam:  General: NAD  CV: RR, S1, S2, no M/R/G  Lungs: CTA b/l, good air flow b/l   Abdomen: Soft, appropriately-tender to palpation diffusely, softly distended, normoactive bowel sounds  Incision: laparoscopic CDI. RLQ drain in place with 20cc serous drainage.  : no bleeding on pad  Ext: No pain or swelling     Labs:                        7.1    15.04 )-----------( 489      ( 18 Nov 2019 05:24 )             22.9   baso x      eos x      imm gran x      lymph x      mono x      poly x                            7.7    19.42 )-----------( 438      ( 17 Nov 2019 08:38 )             24.2   baso x      eos x      imm gran x      lymph x      mono x      poly x                            8.2    20.58 )-----------( 435      ( 16 Nov 2019 08:49 )             25.6   baso x      eos x      imm gran x      lymph x      mono x      poly x                            8.1    21.09 )-----------( 351      ( 15 Nov 2019 07:22 )             25.2   baso x      eos x      imm gran x      lymph x      mono x      poly x          MEDICATIONS  (STANDING):  acetaminophen   Tablet .. 975 milliGRAM(s) Oral every 6 hours  benzocaine 15 mG/menthol 3.6 mG Lozenge 1 Lozenge Oral every 3 hours  ertapenem  IVPB 1000 milliGRAM(s) IV Intermittent every 24 hours  heparin  Injectable 5000 Unit(s) SubCutaneous every 12 hours  ibuprofen  Tablet. 600 milliGRAM(s) Oral every 6 hours  polyethylene glycol 3350 17 Gram(s) Oral daily  simethicone 80 milliGRAM(s) Chew five times a day    MEDICATIONS  (PRN):  HYDROmorphone   Tablet 2 milliGRAM(s) Oral every 4 hours PRN Severe Pain (7 - 10)

## 2019-11-18 NOTE — PROGRESS NOTE ADULT - PROBLEM SELECTOR PLAN 1
Neuro: PO pain meds.   CV: Hemodynamically stable  Pulm: Saturating well on room air, encourage oob/amb  GI: Continue regular diet  : Voiding spontaneously   Heme: c/w HSQ and SCDs for DVT ppx  FEN: SLIV  ID: VSS, Ucx NG, Bcx NGTD, HIV/RPR neg, GC/CT neg. QuantGold negative. Transition to PO abx today per ID recs.  Dispo: Once transitioned to PO ABx pt to be discharged home.    Leonel Keene PGY1

## 2019-11-19 LAB — NON-GYNECOLOGICAL CYTOLOGY STUDY: SIGNIFICANT CHANGE UP

## 2019-11-21 ENCOUNTER — FORM ENCOUNTER (OUTPATIENT)
Age: 44
End: 2019-11-21

## 2019-11-21 LAB
CULTURE RESULTS: SIGNIFICANT CHANGE UP
SPECIMEN SOURCE: SIGNIFICANT CHANGE UP

## 2019-11-22 ENCOUNTER — APPOINTMENT (OUTPATIENT)
Dept: OBGYN | Facility: CLINIC | Age: 44
End: 2019-11-22
Payer: COMMERCIAL

## 2019-11-22 PROCEDURE — 36415 COLL VENOUS BLD VENIPUNCTURE: CPT

## 2019-11-22 PROCEDURE — 99212 OFFICE O/P EST SF 10 MIN: CPT | Mod: 25

## 2019-11-25 ENCOUNTER — FORM ENCOUNTER (OUTPATIENT)
Age: 44
End: 2019-11-25

## 2019-11-26 ENCOUNTER — APPOINTMENT (OUTPATIENT)
Dept: OBGYN | Facility: CLINIC | Age: 44
End: 2019-11-26
Payer: COMMERCIAL

## 2019-11-26 ENCOUNTER — FORM ENCOUNTER (OUTPATIENT)
Age: 44
End: 2019-11-26

## 2019-11-26 PROCEDURE — 36415 COLL VENOUS BLD VENIPUNCTURE: CPT

## 2019-12-01 ENCOUNTER — FORM ENCOUNTER (OUTPATIENT)
Age: 44
End: 2019-12-01

## 2019-12-02 ENCOUNTER — FORM ENCOUNTER (OUTPATIENT)
Age: 44
End: 2019-12-02

## 2019-12-03 ENCOUNTER — FORM ENCOUNTER (OUTPATIENT)
Age: 44
End: 2019-12-03

## 2019-12-03 ENCOUNTER — APPOINTMENT (OUTPATIENT)
Dept: OBGYN | Facility: CLINIC | Age: 44
End: 2019-12-03
Payer: COMMERCIAL

## 2019-12-03 PROCEDURE — 36415 COLL VENOUS BLD VENIPUNCTURE: CPT

## 2019-12-08 ENCOUNTER — FORM ENCOUNTER (OUTPATIENT)
Age: 44
End: 2019-12-08

## 2019-12-09 ENCOUNTER — APPOINTMENT (OUTPATIENT)
Dept: OBGYN | Facility: CLINIC | Age: 44
End: 2019-12-09

## 2019-12-10 NOTE — CONSULT NOTE ADULT - SUBJECTIVE AND OBJECTIVE BOX
Called patient and confirmed date of birth  Discussed negative cell free fetal DNA test results for Trisomy 21, 13 and 18  Discussed that based on her sequential screen results and abnormal ultrasound finding, the negative predictive value of the result for Down syndrome is >99%  Patient stated she was relieved to hear this information  She stated that she is on the fence regarding an amniocentesis and stated that pregnancy termination would be an option if the baby had Down syndrome  We reviewed that the NIPT is just a screening test (although a very good one) and diagnostic testing is the only way to get a yes or no answer  We discussed that if amniocentesis is not performed a diagnosis of Down syndrome would not be made until after delivery, and adoption could be an option  We reviewed the procedure related risks of amniocentesis and the legal limit for termination in [place] of 23w6d gestation  The patient asked how quickly results come back from amniocentesis, thus we discussed that [address] results in 3-5 days with microarray taking an additional 7-10 days  Patient stated she will further consider the option of amniocentesis and will contact our office if she wants to schedule the procedure  She did elect to cancel her genetic counseling appointment on 12/20/19 as all of her questions were answered during our phone conversation  I encouraged her to call with any further questions or concerns  R2 GYN ED CONSULT NOTE    SUBJECTIVE:    43yo  LMP 10/25 presenting w/ fever/chills since Friday and abdominal pain. Patient said that she started feeling under the weather on Thursday and went to urgent care. Got flu swab that was negative and labs drawn. At Urgent care center was diagnosed w/ conjunctivitis and likely viral infection. Also had GBS in urine. Patient took temperature on Friday and was 102-103F. At this time, she also had a vaginal yellow discharge. She followed up with GI who charla labs. Found that she had a WBC of 18.5 and went to urgent are today for evaluation. Patient said that she had increased abdominal pain today which is why she went. She had a CT that prelim read said no appendicitis/kidney stones but something concerning on R. ovary. Upon arrival to ED patient had continued RLQ pain radiating to the flank. She tried tylenol/motrin for pain that provided minimal improvement. Patient said it feels like a constant pressure. She was febrile to 102 and was started on Cefotetan/doxy and given IVF. she was tachy to 120s. Other than abdominal pain/vaginal discharge she is asymptomatic. Patient is sexually active with her . has never had history of STI and never sexually active with another partner.    WBC 20.29. Lactate 1.0. CXR Clear.     Pt denies nausea, vomiting, diarrhea, headache, constipation, dizzyness, syncope, chest pain, palpitations, shortness of breath, dysuria, urgency, frequency, vaginal bleeding/pain/itching, breast lumps/bumps, dyspareunia.    Primary OB/GYN: Dr. Burns  OBH:2x , 2x SAB  GYNH: denies hx of fibroids, ov cysts, abnl paps, sti  PMSH: HLD  MEDS: Simvastatin  ALL: nkda  SOCH: denies t/e/d; safe at home  FAMH: denies fam hx of breast/uterine/ovarian/colon cancer  ROS: negative except per hpi    OBJECTIVE:    VITAL SIGNS:  Vital Signs Last 24 Hrs  T(C): 38.8 (2019 19:30), Max: 39.2 (2019 17:48)  T(F): 101.8 (2019 19:30), Max: 102.5 (2019 17:48)  HR: 111 (2019 19:30) (111 - 120)  BP: 129/66 (2019 19:30) (129/66 - 142/78)  BP(mean): --  RR: 18 (2019 19:30) (18 - 20)  SpO2: 100% (2019 19:30) (100% - 100%)  Height (cm): 154.94 (19 @ 17:48)  Weight (kg): 83.9 (19 @ 17:48)  BMI (kg/m2): 34.9 (19 @ 17:48)  BSA (m2): 1.83 (19 @ 17:48)  CAPILLARY BLOOD GLUCOSE      POCT Blood Glucose.: 87 mg/dL (2019 18:07)        PHYSICAL EXAM:  GEN: NAD, A&Ox3  ABD: soft, ND, mild RLQ tenderness, No guarding/rebound  SPECULUM: Green/yellow discharge from the cervical os, cervix closed  PELVIC: Mild CMT  EXT: WWP, no edema/TTP    LABS:                        9.3    20.29 )-----------( 383      ( 2019 18:30 )             30.5   baso 0.0    eos 0.0    imm gran x      lymph 14.9   mono 4.4    poly 80.7           133<L>  |  97  |  13  ----------------------------<  120<H>  4.1   |  23  |  0.61    Ca    9.5      2019 18:30    TPro  7.9  /  Alb  3.9  /  TBili  0.2  /  DBili  x   /  AST  14  /  ALT  15  /  AlkPhos  74        RADIOLOGY:  CT Scan @ [facility] (): R. ovary asymetrically enlarged measuring 6.6x5.2cm w/ focal calcification and low attenuation. Differential was TOA, R. ovarian mass, Torsion. Normal appendix.    ASSESSMENT: 43yo  LMP 10/25 presenting w/ fever/chills since Friday and abdominal pain. Previously diagnosed w/ conjunctivitis on Thursday. Saw GI MD had a CT that r/o appendicitis but demonstrated a R. ovarian mass. Patient is febrile to 103, Tachy to 120s, and has elevated WBC to 20.9. She was never treated w/ Abx for fever. She received Cefotetan/doxy in ED w/ IVF.     RECOMMENDATIONS:  -CBC, Lactate, CMP, Coags, T&S  -TVUS  -IVF 2 L Bolus w/ continuous 125ml/Hr  -Monitor vitals  -Tylenol/motrin for fever & pain    Shay Vera PGY-2 d/w  R2 GYN ED CONSULT NOTE    SUBJECTIVE:    45yo  LMP 10/25 presenting w/ fever/chills since Friday and abdominal pain. Patient said that she started feeling under the weather on Thursday and went to urgent care. Got flu swab that was negative and labs drawn. At Urgent care center was diagnosed w/ conjunctivitis and likely viral infection. Also had GBS in urine. Patient took temperature on Friday and was 102-103F. At this time, she also had a vaginal yellow discharge. She followed up with GI who charla labs. Found that she had a WBC of 18.5 and went to urgent are today for evaluation. Patient said that she had increased abdominal pain today which is why she went. She had a CT that prelim read said no appendicitis/kidney stones but something concerning on R. ovary. Upon arrival to ED patient had continued RLQ pain radiating to the flank. She tried tylenol/motrin for pain that provided minimal improvement. Patient said it feels like a constant pressure. She was febrile to 102 and was started on Cefotetan/doxy and given IVF. she was tachy to 120s. Other than abdominal pain/vaginal discharge she is asymptomatic. Patient is sexually active with her . has never had history of STI and never sexually active with another partner.    WBC 20.29. Lactate 1.0. CXR Clear.     Pt denies nausea, vomiting, diarrhea, headache, constipation, dizzyness, syncope, chest pain, palpitations, shortness of breath, dysuria, urgency, frequency, vaginal bleeding/pain/itching, breast lumps/bumps, dyspareunia.    Primary OB/GYN: Dr. Burns  OBH:2x , 2x SAB  GYNH: denies hx of fibroids, ov cysts, abnl paps, sti  PMSH: HLD  MEDS: Simvastatin  ALL: nkda  SOCH: denies t/e/d; safe at home  FAMH: denies fam hx of breast/uterine/ovarian/colon cancer  ROS: negative except per hpi    OBJECTIVE:    VITAL SIGNS:  Vital Signs Last 24 Hrs  T(C): 38.8 (2019 19:30), Max: 39.2 (2019 17:48)  T(F): 101.8 (2019 19:30), Max: 102.5 (2019 17:48)  HR: 111 (2019 19:30) (111 - 120)  BP: 129/66 (2019 19:30) (129/66 - 142/78)  BP(mean): --  RR: 18 (2019 19:30) (18 - 20)  SpO2: 100% (2019 19:30) (100% - 100%)  Height (cm): 154.94 (19 @ 17:48)  Weight (kg): 83.9 (19 @ 17:48)  BMI (kg/m2): 34.9 (19 @ 17:48)  BSA (m2): 1.83 (19 @ 17:48)  CAPILLARY BLOOD GLUCOSE      POCT Blood Glucose.: 87 mg/dL (2019 18:07)        PHYSICAL EXAM:  GEN: NAD, A&Ox3  ABD: soft, ND, mild RLQ tenderness, No guarding/rebound  SPECULUM: Green/yellow discharge from the cervical os, cervix closed  PELVIC: Mild CMT  EXT: WWP, no edema/TTP    LABS:                        9.3    20.29 )-----------( 383      ( 2019 18:30 )             30.5   baso 0.0    eos 0.0    imm gran x      lymph 14.9   mono 4.4    poly 80.7           133<L>  |  97  |  13  ----------------------------<  120<H>  4.1   |  23  |  0.61    Ca    9.5      2019 18:30    TPro  7.9  /  Alb  3.9  /  TBili  0.2  /  DBili  x   /  AST  14  /  ALT  15  /  AlkPhos  74        RADIOLOGY:  [facility] (): R. ovary asymetrically enlarged measuring 6.6x5.2cm w/ focal calcification and low attenuation. Differential was TOA, R. ovarian mass, Torsion. Normal appendix.    INTERPRETATION:  CLINICAL INFORMATION: Fever and abdominal pain. CT at   urgent care center with asymmetrically enlarged right ovary containing   calcification. Concern for mass or tubo-ovarian abscess.    LMP: 10/25/2019    Beta-hCG was less than 0.5    COMPARISON: None available.    TECHNIQUE:     Endovaginal pelvic sonogram as per order. Transabdominal pelvic sonogram   was also performed as part of our standard protocol. Color and Spectral   Doppler was performed.    FINDINGS:    Uterus: 9.7 x 5.2 x 5.4 cm. Within normal limits. There is a 1.5 x 1.5 x   1.3 cm right fundal myometrial mass which is likely an intramural fibroid.    Endometrium: 7 mm. Within normal limits.    Right ovary: Complex right adnexal mass containing soft tissue and fluid   echogenicity overall measuring approximately 5.2 x 4.7 x 5.5 cm. A   component measuring 4.7 x 4 x 3.2 cm resembles an abnormal appearing   ovary with normal arterial and venous waveforms. There are thick-walled,   tubular structures with complex internal echoes, echogenic foci with   posterior acoustic shadowing, and peripheral vascularity, which may   reflect dilated, hyperemic fallopian tubes containing complex fluid and   calcifications.    Left ovary: 2.3 x 2.2 x 2.3 cm. Within normal limits. Normal arterial and   venous waveforms.    Fluid: Small free fluid is present within the right adnexa.    IMPRESSION:    Complex right adnexal mass with suspicion of adjacent dilated, hyperemic   and thickened right fallopian tube. Contrast-enhanced pelvic MRI   recommended for further characterization, if there are no   contraindications.

## 2019-12-15 NOTE — CHART NOTE - NSCHARTNOTEFT_GEN_A_CORE
GYN Attg:  Clarrification of queries for sepsis admission:    Pt was admitted meeting sepsis criteria secondary to tubo-ovarian abscess. This condition was present on admission.  This was likely an acute tubooavarian abscess.    BELLA Cardona MD GYN Attg:  Clarrification of queries for sepsis admission:    Pt was admitted meeting sepsis criteria secondary to tubo-ovarian abscess. This condition was present on admission.  This was likely an acute tuboovarian abscess.    BELLA Cardona MD GYN Attg:      Pt was admitted with sepsis secondary to tubo-ovarian abscess. This condition was present on admission.  This was likely an acute tuboovarian abscess.    BELLA Cardona MD

## 2020-04-19 ENCOUNTER — FORM ENCOUNTER (OUTPATIENT)
Age: 45
End: 2020-04-19

## 2020-04-26 ENCOUNTER — MESSAGE (OUTPATIENT)
Age: 45
End: 2020-04-26

## 2020-04-29 LAB
SARS-COV-2 IGG SERPL IA-ACNC: 0.2 RATIO
SARS-COV-2 IGG SERPL QL IA: NEGATIVE

## 2020-05-12 ENCOUNTER — FORM ENCOUNTER (OUTPATIENT)
Age: 45
End: 2020-05-12

## 2020-05-18 ENCOUNTER — FORM ENCOUNTER (OUTPATIENT)
Age: 45
End: 2020-05-18

## 2021-01-04 ENCOUNTER — FORM ENCOUNTER (OUTPATIENT)
Age: 46
End: 2021-01-04

## 2021-01-04 ENCOUNTER — RESULT REVIEW (OUTPATIENT)
Age: 46
End: 2021-01-04

## 2021-01-05 ENCOUNTER — APPOINTMENT (OUTPATIENT)
Dept: OBGYN | Facility: CLINIC | Age: 46
End: 2021-01-05
Payer: COMMERCIAL

## 2021-01-05 ENCOUNTER — FORM ENCOUNTER (OUTPATIENT)
Age: 46
End: 2021-01-05

## 2021-01-05 PROCEDURE — 99072 ADDL SUPL MATRL&STAF TM PHE: CPT

## 2021-01-05 PROCEDURE — 99396 PREV VISIT EST AGE 40-64: CPT

## 2021-01-12 ENCOUNTER — FORM ENCOUNTER (OUTPATIENT)
Age: 46
End: 2021-01-12

## 2021-07-22 NOTE — CONSULT NOTE ADULT - NEGATIVE RESPIRATORY AND THORAX SYMPTOMS
ANTICOAGULATION MANAGEMENT     Karen Anderson 76 year old female is on warfarin with subtherapeutic INR result. (Goal INR 2.0-2.5)    Recent labs: (last 7 days)     07/22/21  1200   INR 1.7*       ASSESSMENT     Source(s): Chart Review and Patient/Caregiver Call       Warfarin doses taken: More warfarin taken than planned which may be affecting INR Karen states she held warfarin 7/14; then took 4.5 mg W and 3 mg ROW    Diet: No new diet changes identified    New illness, injury, or hospitalization: No    Medication/supplement changes: remodulin dose increasing every 6 days    Signs or symptoms of bleeding or clotting: No    Previous INR: Subtherapeutic    Additional findings: None     PLAN     Recommended plan for no diet, medication or health factor changes affecting INR     Dosing Instructions: Continue your current warfarin dose with next INR in 1 week       Summary  As of 7/22/2021    Full warfarin instructions:  4.5 mg every Wed; 3 mg all other days   Next INR check:  7/29/2021             Telephone call with home care nurse Daria and with Karen who agrees to plan and repeated back plan correctly.  Karen has been discharged from home care today, 7/22/21.    Lab visit scheduled  Home Monitor form filled out and sent to [facility].    Education provided: Please call back if any changes to your diet, medications or how you've been taking warfarin, Monitoring for bleeding signs and symptoms, When to seek medical attention/emergency care and Contact [phone] with any changes, questions or concerns.     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 OK to speak with spouse, Don.  [facility] SPEAK IN TABLETS (Has 3mg Tabs) 10/23/20:  new goal range is 2 - 2.5  [facility]'s in [place] and will need to fax orders P [phone] F [phone]         Anticoagulation Care Providers     Provider Role Specialty Phone number    Karolina Turcios MD Referring Cardiovascular Disease [phone]             no wheezing/no dyspnea/no cough

## 2021-08-31 ENCOUNTER — APPOINTMENT (OUTPATIENT)
Dept: ORTHOPEDIC SURGERY | Facility: CLINIC | Age: 46
End: 2021-08-31
Payer: COMMERCIAL

## 2021-08-31 ENCOUNTER — NON-APPOINTMENT (OUTPATIENT)
Age: 46
End: 2021-08-31

## 2021-08-31 VITALS
WEIGHT: 190 LBS | HEART RATE: 86 BPM | DIASTOLIC BLOOD PRESSURE: 96 MMHG | HEIGHT: 61 IN | OXYGEN SATURATION: 95 % | BODY MASS INDEX: 35.87 KG/M2 | SYSTOLIC BLOOD PRESSURE: 145 MMHG

## 2021-08-31 DIAGNOSIS — M25.562 PAIN IN LEFT KNEE: ICD-10-CM

## 2021-08-31 PROCEDURE — 99203 OFFICE O/P NEW LOW 30 MIN: CPT

## 2021-08-31 PROCEDURE — 73564 X-RAY EXAM KNEE 4 OR MORE: CPT | Mod: LT

## 2021-08-31 NOTE — HISTORY OF PRESENT ILLNESS
[Worsening] : worsening [___ yrs] : [unfilled] year(s) ago [2] : an average pain level of 2/10 [0] : a minimum pain level of 0/10 [6] : a maximum pain level of 6/10 [Intermit.] : ~He/She~ states the symptoms seem to be intermittent [Walking] : worsened by walking [Knee Flexion] : worsened with knee flexion [Rest] : relieved by rest [de-identified] : Pt presents for initial evaluation with pain in her left knee, there is no known injury to the knee, there is occasional clicking  pt has taken Aleve and applied lido cream topically.  [de-identified] : certain movements, [de-identified] : medication

## 2021-08-31 NOTE — DISCUSSION/SUMMARY
[de-identified] : The patient was informed of her findings and shown her x-rays.  She was started on diclofenac and will be referred to physical therapy.  Follow-up in 4 to 6 weeks if she remains symptomatic we may consider cortisone injection.

## 2021-08-31 NOTE — PHYSICAL EXAM
[de-identified] : Physical examination of the patient's left knee discloses mild tenderness to the medial compartment.  No effusions no defects or deformities.  Full stable range of motion [de-identified] : X-rays taken of the left knee and AP lateral skyline and open notch views disclose mild medial joint space narrowing with peripheral osteophytes.

## 2021-09-01 RX ORDER — DICLOFENAC SODIUM 75 MG/1
75 TABLET, DELAYED RELEASE ORAL
Qty: 60 | Refills: 0 | Status: ACTIVE | COMMUNITY
Start: 2021-09-01 | End: 1900-01-01

## 2021-09-20 ENCOUNTER — APPOINTMENT (OUTPATIENT)
Dept: ORTHOPEDIC SURGERY | Facility: CLINIC | Age: 46
End: 2021-09-20
Payer: COMMERCIAL

## 2021-09-20 VITALS
OXYGEN SATURATION: 99 % | DIASTOLIC BLOOD PRESSURE: 106 MMHG | WEIGHT: 190 LBS | BODY MASS INDEX: 35.87 KG/M2 | HEIGHT: 61 IN | SYSTOLIC BLOOD PRESSURE: 156 MMHG | HEART RATE: 84 BPM

## 2021-09-20 DIAGNOSIS — M17.12 UNILATERAL PRIMARY OSTEOARTHRITIS, LEFT KNEE: ICD-10-CM

## 2021-09-20 PROCEDURE — 99024 POSTOP FOLLOW-UP VISIT: CPT

## 2021-09-20 PROCEDURE — 20611 DRAIN/INJ JOINT/BURSA W/US: CPT

## 2021-09-20 NOTE — DISCUSSION/SUMMARY
[de-identified] : Following the patient's cortisone injection to her left knee she was advised on rest ice and continue NSAIDs and therapy.  She will be reevaluated on a as needed basis

## 2021-09-20 NOTE — PHYSICAL EXAM
[de-identified] : Physical examination of the left knee discloses medial joint line tenderness to palpation with some limited flexion secondary to pain

## 2021-09-20 NOTE — PROCEDURE
[de-identified] : Under sterile technique using ultrasound-guided needle placement the patient underwent Zilretta injection to the left knee.\par \par A discussion took place with the patient regarding the procedure. General risks and benefits were reviewed.\par The suprapatellar region of the knee was prepped to attain a sterile field. Ethyl Chloride spray was used as a topical anesthetic. \par A 22-gauge 1-1/2 inch needle was used to inject the medication.\par The procedure was performed utilizing ultrasound guided needle placement with the Sonosite linear transducer in order to visualize and confirm the location of the needle tip and intra-articular delivery of the medication in the exact location desired to achieve maximal benefit from the medication. The images were recorded and saved.\par The injection site was sterilely dressed and the patient tolerated the procedure well, without complications.\par The patient was given post injection instructions including rest, cool pack applications, and take NSAIDs or acetaminophen. The patient was advised that if there are worsening symptoms or any associated problems, to contact our office accordingly

## 2021-09-20 NOTE — HISTORY OF PRESENT ILLNESS
[Worsening] : worsening [___ yrs] : [unfilled] year(s) ago [5] : a maximum pain level of 5/10 [Walking] : worsened by walking [Knee Flexion] : worsened with knee flexion [Rest] : relieved by rest [None] : No relieving factors are noted [de-identified] : Pt returns for follow up for pain in her left knee, pt states the diclofenac and physical therapy are not helping. Pt is receiving cortisone injection to the left knee. [de-identified] : certain movements

## 2021-10-05 ENCOUNTER — FORM ENCOUNTER (OUTPATIENT)
Age: 46
End: 2021-10-05

## 2021-12-13 DIAGNOSIS — Z78.9 OTHER SPECIFIED HEALTH STATUS: ICD-10-CM

## 2021-12-13 DIAGNOSIS — N70.93 SALPINGITIS AND OOPHORITIS, UNSPECIFIED: ICD-10-CM

## 2021-12-13 DIAGNOSIS — Z12.72 ENCOUNTER FOR SCREENING FOR MALIGNANT NEOPLASM OF VAGINA: ICD-10-CM

## 2021-12-13 DIAGNOSIS — R87.629 UNSPECIFIED ABNORMAL CYTOLOGICAL FINDINGS IN SPECIMENS FROM VAGINA: ICD-10-CM

## 2021-12-13 DIAGNOSIS — Z86.018 PERSONAL HISTORY OF OTHER BENIGN NEOPLASM: ICD-10-CM

## 2021-12-13 DIAGNOSIS — Z83.3 FAMILY HISTORY OF DIABETES MELLITUS: ICD-10-CM

## 2021-12-13 DIAGNOSIS — Z80.0 FAMILY HISTORY OF MALIGNANT NEOPLASM OF DIGESTIVE ORGANS: ICD-10-CM

## 2021-12-13 DIAGNOSIS — E78.5 HYPERLIPIDEMIA, UNSPECIFIED: ICD-10-CM

## 2022-01-26 ENCOUNTER — APPOINTMENT (OUTPATIENT)
Dept: OBGYN | Facility: CLINIC | Age: 47
End: 2022-01-26
Payer: COMMERCIAL

## 2022-01-26 VITALS
WEIGHT: 180 LBS | DIASTOLIC BLOOD PRESSURE: 84 MMHG | BODY MASS INDEX: 33.99 KG/M2 | HEIGHT: 61 IN | SYSTOLIC BLOOD PRESSURE: 125 MMHG

## 2022-01-26 DIAGNOSIS — Z00.00 ENCOUNTER FOR GENERAL ADULT MEDICAL EXAMINATION W/OUT ABNORMAL FINDINGS: ICD-10-CM

## 2022-01-26 PROCEDURE — 99396 PREV VISIT EST AGE 40-64: CPT

## 2022-01-26 NOTE — END OF VISIT
[FreeTextEntry3] : I, Janis Lemos, acted as a scribe on behalf of Dr. Jaycob Cardona on 01/26/2022.\par \par All medical entries made by the scribe were at my, Janis Lemos, direction and personally dictated by me on 01/26/2022. I have reviewed the chart and agree that the record accurately reflects my personal performance of the history, physical exam, assessment and plan. I have also personally directed, reviewed, and agreed with the chart.

## 2022-01-26 NOTE — HISTORY OF PRESENT ILLNESS
[Patient reported PAP Smear was normal] : Patient reported PAP Smear was normal [FreeTextEntry1] : 47 y/o  LMP 22 presents for annual wellness exam. She is doing well and has no complaints.\par \par GYN Hx: TOA\par SHx: s/p D&C lsc [facility] RSO left salpingectomy on 11/15/19 for TOA.\par denies pain.\par  [PapSmeardate] : 01/2021

## 2022-01-26 NOTE — PLAN
[FreeTextEntry1] : 47 y/o  LMP 22, annual wellness exam. stable\par \par Health care maintenance\par -pap\par -Ca/vit D/exercise\par -breast mammo/sono\par -colon screening reviewed\par -f/u PCP for annual and appropriate immunizations\par -rto 1 year

## 2022-01-28 LAB — HPV HIGH+LOW RISK DNA PNL CVX: NOT DETECTED

## 2022-02-01 LAB — CYTOLOGY CVX/VAG DOC THIN PREP: NORMAL
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
## 2023-03-07 ENCOUNTER — EMERGENCY (EMERGENCY)
Facility: HOSPITAL | Age: 48
LOS: 1 days | Discharge: HOME | End: 2023-03-07
Attending: EMERGENCY MEDICINE
Payer: COMMERCIAL

## 2023-03-07 VITALS
WEIGHT: 145.06 LBS | HEART RATE: 74 BPM | OXYGEN SATURATION: 100 % | SYSTOLIC BLOOD PRESSURE: 156 MMHG | DIASTOLIC BLOOD PRESSURE: 81 MMHG | TEMPERATURE: 98 F | RESPIRATION RATE: 16 BRPM | HEIGHT: 61 IN

## 2023-03-07 PROCEDURE — 99282 EMERGENCY DEPT VISIT SF MDM: CPT

## 2023-03-07 PROCEDURE — 99284 EMERGENCY DEPT VISIT MOD MDM: CPT

## 2023-03-07 RX ORDER — IBUPROFEN 200 MG
600 TABLET ORAL ONCE
Refills: 0 | Status: COMPLETED | OUTPATIENT
Start: 2023-03-07 | End: 2023-03-07

## 2023-03-07 RX ORDER — ACETAMINOPHEN 500 MG
975 TABLET ORAL ONCE
Refills: 0 | Status: COMPLETED | OUTPATIENT
Start: 2023-03-07 | End: 2023-03-07

## 2023-03-07 RX ADMIN — Medication 600 MILLIGRAM(S): at 11:28

## 2023-03-07 RX ADMIN — Medication 975 MILLIGRAM(S): at 11:28

## 2023-03-07 NOTE — ED ADULT NURSE NOTE - CAS TRG GEN SKIN CONDITION
Called and spoke to patient; offered to schedule procedure on 9/30 as order asked for it the be scheduled ASAP. Patient exclaimed \"oh god' asking if it really needed to be scheduled so soon. I offered 10/14 and she accepted. When scheduling pre procedure covid, Patient would only go to [place] however there was nothing available in the timeframe needed for 10/14. I offered different locations, but Patient stated she would only go to [place] because they \"don't stick it up all the way\". Patient asked for a different date as long as she can test at [facility] [place]. Offered 10/28 with available testing on 10/26. Patient agreed.    Warm
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
## 2023-03-07 NOTE — ED PROVIDER NOTE - PATIENT PORTAL LINK FT
You can access the FollowMyHealth Patient Portal offered by [facility] by registering at the following website: [email or web address]. By joining [facility]’s FollowMyHealth portal, you will also be able to view your health information using other applications (apps) compatible with our system.
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
## 2023-03-07 NOTE — ED ADULT NURSE NOTE - CHIEF COMPLAINT
Chief complaint:   Chief Complaint   Patient presents with   • Dizziness     patient states she got hit in head playing Guard       Vitals:  Visit Vitals  BP 96/64 (BP Location: RUE, Patient Position: Sitting, Cuff Size: Regular)   Pulse 55   Temp 97.5 °F (36.4 °C) (Oral)   Ht 5' 7\" (1.702 m)   Wt 60.8 kg   SpO2 98%   BMI 21.00 kg/m²       HISTORY OF PRESENT ILLNESS     Jenni is a 12yo female who is seen today with concern of concussion, she was hit in the head while playing color guard on Friday.  Didn't lose consciousness.  Her flag hit her in her head while doing a high toss.  Got dizzy, headache immediately after, sat down after.  Associated fatigue, photophobia.  Denies nausea.  Did perform that night.  Didn't feel off that night.  Dizziness has persisted since, intermittently - no specific triggers.  Has been playing on her phone, but  To a lesser degree.  Sat out of science and math because of a severe headache, headache got worse during those classes.  Doesn't watch much TV.  She performed with the guard over the weekend, did OK during but had a severe headache after.        Other significant problems:  Patient Active Problem List    Diagnosis Date Noted   • Metatarsal bone fracture 12/12/2013     Priority: Low       PAST MEDICAL, FAMILY AND SOCIAL HISTORY     Medications:  No current outpatient prescriptions on file.     No current facility-administered medications for this visit.        Allergies:  ALLERGIES:   Allergen Reactions   • Cephalosporins RASH       Past Medical  History/Surgeries:  No past medical history on file.    Past Surgical History:   Procedure Laterality Date   • TYMPANOSTOMY      1 yrs old   • TYMPANOSTOMY TUBE PLACEMENT         Family History:  No family history on file.    Social History:  Social History   Substance Use Topics   • Smoking status: Never Smoker   • Smokeless tobacco: Never Used   • Alcohol use No       REVIEW OF SYSTEMS     Review of Systems   Eyes: Positive for  photophobia.   Neurological: Positive for headaches.       PHYSICAL EXAM     Physical Exam   Constitutional: She is oriented to person, place, and time. She appears well-developed and well-nourished.   HENT:   Head: Normocephalic and atraumatic.   Eyes: Pupils are equal, round, and reactive to light. Right eye exhibits no discharge. Left eye exhibits no discharge.   Cardiovascular: Normal rate, regular rhythm, normal heart sounds and intact distal pulses.    No murmur heard.  Pulmonary/Chest: Effort normal and breath sounds normal. No respiratory distress. She has no wheezes.   Musculoskeletal: She exhibits no edema.   Neurological: She is alert and oriented to person, place, and time. No cranial nerve deficit.   Does have some nystagmus with following to left.   Skin: Skin is warm and dry. No rash noted. No erythema.   Psychiatric: She has a normal mood and affect. Her behavior is normal. Judgment and thought content normal.   Nursing note and vitals reviewed.      ASSESSMENT/PLAN     Concussion  · Education on what to do and what not to do after concussion   · Printed handout on concussion provided  · NSAIDs for headache  · Note for school completed, having difficulties in math/science since injury.  · Follow up will be based on symptoms    Jenni verbalized understanding and agreement with plan of care.  All questions/concerns addressed during  Visit.    SHER Gauthier     The patient is a 48y Female complaining of

## 2023-03-07 NOTE — ED ADULT NURSE NOTE - NSFALLRSKPASTHIST_ED_ALL_ED
She was complaining of chest pain , with her cardiac history she is scheduled to see cardiology and cath before surgery no
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
## 2023-03-07 NOTE — ED PROVIDER NOTE - NS ED ATTENDING STATEMENT MOD
This was a shared visit with the RUBI. I reviewed and verified the documentation and independently performed the documented:
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
## 2023-03-21 ENCOUNTER — APPOINTMENT (OUTPATIENT)
Dept: OBGYN | Facility: CLINIC | Age: 48
End: 2023-03-21
Payer: COMMERCIAL

## 2023-03-21 VITALS
SYSTOLIC BLOOD PRESSURE: 135 MMHG | WEIGHT: 145 LBS | HEIGHT: 61 IN | DIASTOLIC BLOOD PRESSURE: 85 MMHG | BODY MASS INDEX: 27.38 KG/M2

## 2023-03-21 DIAGNOSIS — Z01.419 ENCOUNTER FOR GYNECOLOGICAL EXAMINATION (GENERAL) (ROUTINE) W/OUT ABNORMAL FINDINGS: ICD-10-CM

## 2023-03-21 PROCEDURE — 99396 PREV VISIT EST AGE 40-64: CPT

## 2023-03-21 NOTE — ED ADULT TRIAGE NOTE - AS HEIGHT TYPE
----- Message from Gaetano Bloch sent at 3/21/2023  1:56 PM EDT -----  Subject: Message to Provider    QUESTIONS  Information for Provider? Patient would like to know if Dr. She Haq would   like to order any additional bloodwork besides his Lipid Panel? He has an   appt on 3/27. He would like his PSA checked again. He also asked if he can   make an appt to get his ears cleaned out as well.  ---------------------------------------------------------------------------  --------------  Tess Khan INFO  [phone]; Do not leave any message, patient will call back for answer  ---------------------------------------------------------------------------  --------------  SCRIPT ANSWERS  Relationship to Patient?  Self Check tsh    Cont synthroid   stated

## 2023-03-29 PROBLEM — Z01.419 PAPANICOLAOU SMEAR, AS PART OF ROUTINE GYNECOLOGICAL EXAMINATION: Status: ACTIVE | Noted: 2022-01-26

## 2023-03-29 NOTE — END OF VISIT
[FreeTextEntry3] : I, Michael Hunter, acted as a scribe on behalf of Dr. Jaycob Cardona on 03/21/2023 .\par \par All medical entries made by the scribe were at my, Dr. Jaycob Cardona's, direction and personally dictated by me on 03/21/2023. I have reviewed the chart and agree that the record accurately reflects my personal performance of the history, physical exam, assessment and plan. I have also personally directed, reviewed, and agreed with the chart.

## 2023-03-29 NOTE — PLAN
[FreeTextEntry1] : 49 yo  LMP 3/5/23 for annual, stable.\par \par HCM\par -pap done today\par -vit D/exercise/calcium\par -f/u pcp for osteo screening\par -breast mammo/sono\par -colon screening reviewed\par -f/u PCP for annual and appropriate immunizations\par -rto 1 year for annual exam\par

## 2023-03-29 NOTE — HISTORY OF PRESENT ILLNESS
[Patient reported mammogram was normal] : Patient reported mammogram was normal [Patient reported PAP Smear was normal] : Patient reported PAP Smear was normal [FreeTextEntry1] : 49 yo  LMP 3/5/23 presents for annual. Pt is followed by nutritionist / weight loss program and lost over 35 lbs, currently on Mounjaro. She is doing well and has no complaints.\par \par GYN Hx: TOA\par SHx: s/p D&C lsc [facility] RSO left salpingectomy on 11/15/19 for TOA.\par denies pain.\par  [Mammogramdate] : 2/22 [TextBox_19] : BIRADS-1 [TextBox_31] : HPV- [PapSmeardate] : 1/22

## 2024-03-04 ENCOUNTER — EMERGENCY (EMERGENCY)
Facility: HOSPITAL | Age: 49
LOS: 1 days | Discharge: HOME | End: 2024-03-04
Attending: STUDENT IN AN ORGANIZED HEALTH CARE EDUCATION/TRAINING PROGRAM
Payer: COMMERCIAL

## 2024-03-04 VITALS
RESPIRATION RATE: 16 BRPM | SYSTOLIC BLOOD PRESSURE: 174 MMHG | HEIGHT: 61 IN | TEMPERATURE: 99 F | DIASTOLIC BLOOD PRESSURE: 99 MMHG | HEART RATE: 83 BPM | OXYGEN SATURATION: 97 % | WEIGHT: 147.93 LBS

## 2024-03-04 LAB — GAS PNL BLDV: SIGNIFICANT CHANGE UP

## 2024-03-04 PROCEDURE — 99285 EMERGENCY DEPT VISIT HI MDM: CPT

## 2024-03-04 RX ORDER — ACETAMINOPHEN 500 MG
1000 TABLET ORAL ONCE
Refills: 0 | Status: COMPLETED | OUTPATIENT
Start: 2024-03-04 | End: 2024-03-04

## 2024-03-04 RX ORDER — SODIUM CHLORIDE 9 MG/ML
1000 INJECTION INTRAMUSCULAR; INTRAVENOUS; SUBCUTANEOUS ONCE
Refills: 0 | Status: COMPLETED | OUTPATIENT
Start: 2024-03-04 | End: 2024-03-04

## 2024-03-04 RX ORDER — MORPHINE SULFATE 50 MG/1
4 CAPSULE, EXTENDED RELEASE ORAL ONCE
Refills: 0 | Status: DISCONTINUED | OUTPATIENT
Start: 2024-03-04 | End: 2024-03-04

## 2024-03-04 RX ORDER — ONDANSETRON 8 MG/1
4 TABLET, FILM COATED ORAL ONCE
Refills: 0 | Status: COMPLETED | OUTPATIENT
Start: 2024-03-04 | End: 2024-03-04

## 2024-03-04 RX ADMIN — SODIUM CHLORIDE 1000 MILLILITER(S): 9 INJECTION INTRAMUSCULAR; INTRAVENOUS; SUBCUTANEOUS at 23:36

## 2024-03-04 RX ADMIN — ONDANSETRON 4 MILLIGRAM(S): 8 TABLET, FILM COATED ORAL at 23:35

## 2024-03-04 RX ADMIN — MORPHINE SULFATE 4 MILLIGRAM(S): 50 CAPSULE, EXTENDED RELEASE ORAL at 23:34

## 2024-03-04 RX ADMIN — Medication 400 MILLIGRAM(S): at 23:34

## 2024-03-04 NOTE — ED ADULT TRIAGE NOTE - CHIEF COMPLAINT QUOTE
reports right sided flank and pelvic pain associated with nausea  denies urinary symptoms  hx of cyst and ovary removal on left side

## 2024-03-05 VITALS
OXYGEN SATURATION: 100 % | HEART RATE: 100 BPM | TEMPERATURE: 98 F | DIASTOLIC BLOOD PRESSURE: 75 MMHG | RESPIRATION RATE: 18 BRPM | SYSTOLIC BLOOD PRESSURE: 121 MMHG

## 2024-03-05 LAB
ALBUMIN SERPL ELPH-MCNC: 4.8 G/DL — SIGNIFICANT CHANGE UP (ref 3.3–5)
ALP SERPL-CCNC: 54 U/L — SIGNIFICANT CHANGE UP (ref 40–120)
ALT FLD-CCNC: 22 U/L — SIGNIFICANT CHANGE UP (ref 10–45)
ANION GAP SERPL CALC-SCNC: 14 MMOL/L — SIGNIFICANT CHANGE UP (ref 5–17)
APPEARANCE UR: ABNORMAL
APTT BLD: 23.2 SEC — LOW (ref 24.5–35.6)
AST SERPL-CCNC: 25 U/L — SIGNIFICANT CHANGE UP (ref 10–40)
BACTERIA # UR AUTO: ABNORMAL /HPF
BASE EXCESS BLDV CALC-SCNC: -3.6 MMOL/L — LOW (ref -2–3)
BASOPHILS # BLD AUTO: 0.04 K/UL — SIGNIFICANT CHANGE UP (ref 0–0.2)
BASOPHILS NFR BLD AUTO: 0.3 % — SIGNIFICANT CHANGE UP (ref 0–2)
BILIRUB SERPL-MCNC: 0.5 MG/DL — SIGNIFICANT CHANGE UP (ref 0.2–1.2)
BILIRUB UR-MCNC: NEGATIVE — SIGNIFICANT CHANGE UP
BUN SERPL-MCNC: 25 MG/DL — HIGH (ref 7–23)
CA-I SERPL-SCNC: 1.2 MMOL/L — SIGNIFICANT CHANGE UP (ref 1.15–1.33)
CALCIUM SERPL-MCNC: 9.8 MG/DL — SIGNIFICANT CHANGE UP (ref 8.4–10.5)
CAST: 6 /LPF — HIGH (ref 0–4)
CHLORIDE BLDV-SCNC: 104 MMOL/L — SIGNIFICANT CHANGE UP (ref 96–108)
CHLORIDE SERPL-SCNC: 103 MMOL/L — SIGNIFICANT CHANGE UP (ref 96–108)
CO2 BLDV-SCNC: 24 MMOL/L — SIGNIFICANT CHANGE UP (ref 22–26)
CO2 SERPL-SCNC: 22 MMOL/L — SIGNIFICANT CHANGE UP (ref 22–31)
COD CRY URNS QL: PRESENT
COLOR SPEC: ABNORMAL
CREAT SERPL-MCNC: 0.73 MG/DL — SIGNIFICANT CHANGE UP (ref 0.5–1.3)
DIFF PNL FLD: ABNORMAL
EGFR: 100 ML/MIN/1.73M2 — SIGNIFICANT CHANGE UP
EOSINOPHIL # BLD AUTO: 0.05 K/UL — SIGNIFICANT CHANGE UP (ref 0–0.5)
EOSINOPHIL NFR BLD AUTO: 0.4 % — SIGNIFICANT CHANGE UP (ref 0–6)
GAS PNL BLDV: 134 MMOL/L — LOW (ref 136–145)
GAS PNL BLDV: SIGNIFICANT CHANGE UP
GLUCOSE BLDV-MCNC: 96 MG/DL — SIGNIFICANT CHANGE UP (ref 70–99)
GLUCOSE SERPL-MCNC: 106 MG/DL — HIGH (ref 70–99)
GLUCOSE UR QL: NEGATIVE MG/DL — SIGNIFICANT CHANGE UP
HCG SERPL-ACNC: <2 MIU/ML — SIGNIFICANT CHANGE UP
HCO3 BLDV-SCNC: 23 MMOL/L — SIGNIFICANT CHANGE UP (ref 22–29)
HCT VFR BLD CALC: 41.7 % — SIGNIFICANT CHANGE UP (ref 34.5–45)
HCT VFR BLDA CALC: 40 % — SIGNIFICANT CHANGE UP (ref 34.5–46.5)
HGB BLD CALC-MCNC: 13.3 G/DL — SIGNIFICANT CHANGE UP (ref 11.7–16.1)
HGB BLD-MCNC: 13.6 G/DL — SIGNIFICANT CHANGE UP (ref 11.5–15.5)
HOROWITZ INDEX BLDV+IHG-RTO: SIGNIFICANT CHANGE UP
IMM GRANULOCYTES NFR BLD AUTO: 0.4 % — SIGNIFICANT CHANGE UP (ref 0–0.9)
INR BLD: 1.07 RATIO — SIGNIFICANT CHANGE UP (ref 0.85–1.18)
KETONES UR-MCNC: NEGATIVE MG/DL — SIGNIFICANT CHANGE UP
LACTATE BLDV-MCNC: 2 MMOL/L — SIGNIFICANT CHANGE UP (ref 0.5–2)
LEUKOCYTE ESTERASE UR-ACNC: ABNORMAL
LIDOCAIN IGE QN: 39 U/L — SIGNIFICANT CHANGE UP (ref 7–60)
LYMPHOCYTES # BLD AUTO: 16.3 % — SIGNIFICANT CHANGE UP (ref 13–44)
LYMPHOCYTES # BLD AUTO: 2.27 K/UL — SIGNIFICANT CHANGE UP (ref 1–3.3)
MCHC RBC-ENTMCNC: 24.8 PG — LOW (ref 27–34)
MCHC RBC-ENTMCNC: 32.6 GM/DL — SIGNIFICANT CHANGE UP (ref 32–36)
MCV RBC AUTO: 76 FL — LOW (ref 80–100)
MONOCYTES # BLD AUTO: 0.43 K/UL — SIGNIFICANT CHANGE UP (ref 0–0.9)
MONOCYTES NFR BLD AUTO: 3.1 % — SIGNIFICANT CHANGE UP (ref 2–14)
MUCOUS THREADS # UR AUTO: PRESENT
NEUTROPHILS # BLD AUTO: 11.06 K/UL — HIGH (ref 1.8–7.4)
NEUTROPHILS NFR BLD AUTO: 79.5 % — HIGH (ref 43–77)
NITRITE UR-MCNC: NEGATIVE — SIGNIFICANT CHANGE UP
NRBC # BLD: 0 /100 WBCS — SIGNIFICANT CHANGE UP (ref 0–0)
PCO2 BLDV: 47 MMHG — HIGH (ref 39–42)
PH BLDV: 7.3 — LOW (ref 7.32–7.43)
PH UR: 5 — SIGNIFICANT CHANGE UP (ref 5–8)
PLATELET # BLD AUTO: 288 K/UL — SIGNIFICANT CHANGE UP (ref 150–400)
PO2 BLDV: 32 MMHG — SIGNIFICANT CHANGE UP (ref 25–45)
POTASSIUM BLDV-SCNC: 5.2 MMOL/L — HIGH (ref 3.5–5.1)
POTASSIUM SERPL-MCNC: 4.4 MMOL/L — SIGNIFICANT CHANGE UP (ref 3.5–5.3)
POTASSIUM SERPL-SCNC: 4.4 MMOL/L — SIGNIFICANT CHANGE UP (ref 3.5–5.3)
PROT SERPL-MCNC: 8.2 G/DL — SIGNIFICANT CHANGE UP (ref 6–8.3)
PROT UR-MCNC: 100 MG/DL
PROTHROM AB SERPL-ACNC: 11.2 SEC — SIGNIFICANT CHANGE UP (ref 9.5–13)
RBC # BLD: 5.49 M/UL — HIGH (ref 3.8–5.2)
RBC # FLD: 14.6 % — HIGH (ref 10.3–14.5)
RBC CASTS # UR COMP ASSIST: >1900 /HPF — HIGH (ref 0–4)
REVIEW: SIGNIFICANT CHANGE UP
SAO2 % BLDV: 47 % — LOW (ref 67–88)
SODIUM SERPL-SCNC: 139 MMOL/L — SIGNIFICANT CHANGE UP (ref 135–145)
SP GR SPEC: >1.03 — HIGH (ref 1–1.03)
SQUAMOUS # UR AUTO: 9 /HPF — HIGH (ref 0–5)
UROBILINOGEN FLD QL: 0.2 MG/DL — SIGNIFICANT CHANGE UP (ref 0.2–1)
WBC # BLD: 13.9 K/UL — HIGH (ref 3.8–10.5)
WBC # FLD AUTO: 13.9 K/UL — HIGH (ref 3.8–10.5)
WBC UR QL: 15 /HPF — HIGH (ref 0–5)
YEAST-LIKE CELLS: PRESENT

## 2024-03-05 PROCEDURE — 74177 CT ABD & PELVIS W/CONTRAST: CPT | Mod: MC

## 2024-03-05 PROCEDURE — 80053 COMPREHEN METABOLIC PANEL: CPT

## 2024-03-05 PROCEDURE — 82947 ASSAY GLUCOSE BLOOD QUANT: CPT

## 2024-03-05 PROCEDURE — 96374 THER/PROPH/DIAG INJ IV PUSH: CPT | Mod: XU

## 2024-03-05 PROCEDURE — 83605 ASSAY OF LACTIC ACID: CPT

## 2024-03-05 PROCEDURE — 84132 ASSAY OF SERUM POTASSIUM: CPT

## 2024-03-05 PROCEDURE — 81001 URINALYSIS AUTO W/SCOPE: CPT

## 2024-03-05 PROCEDURE — 82435 ASSAY OF BLOOD CHLORIDE: CPT

## 2024-03-05 PROCEDURE — 82330 ASSAY OF CALCIUM: CPT

## 2024-03-05 PROCEDURE — 85014 HEMATOCRIT: CPT

## 2024-03-05 PROCEDURE — 74177 CT ABD & PELVIS W/CONTRAST: CPT | Mod: 26,MC

## 2024-03-05 PROCEDURE — 82803 BLOOD GASES ANY COMBINATION: CPT

## 2024-03-05 PROCEDURE — 93005 ELECTROCARDIOGRAM TRACING: CPT

## 2024-03-05 PROCEDURE — 85025 COMPLETE CBC W/AUTO DIFF WBC: CPT

## 2024-03-05 PROCEDURE — 85730 THROMBOPLASTIN TIME PARTIAL: CPT

## 2024-03-05 PROCEDURE — 85018 HEMOGLOBIN: CPT

## 2024-03-05 PROCEDURE — 96375 TX/PRO/DX INJ NEW DRUG ADDON: CPT

## 2024-03-05 PROCEDURE — 84295 ASSAY OF SERUM SODIUM: CPT

## 2024-03-05 PROCEDURE — 82962 GLUCOSE BLOOD TEST: CPT

## 2024-03-05 PROCEDURE — 85610 PROTHROMBIN TIME: CPT

## 2024-03-05 PROCEDURE — 84702 CHORIONIC GONADOTROPIN TEST: CPT

## 2024-03-05 PROCEDURE — 87086 URINE CULTURE/COLONY COUNT: CPT

## 2024-03-05 PROCEDURE — 96376 TX/PRO/DX INJ SAME DRUG ADON: CPT

## 2024-03-05 PROCEDURE — 83690 ASSAY OF LIPASE: CPT

## 2024-03-05 PROCEDURE — 99285 EMERGENCY DEPT VISIT HI MDM: CPT | Mod: 25

## 2024-03-05 RX ORDER — TAMSULOSIN HYDROCHLORIDE 0.4 MG/1
1 CAPSULE ORAL
Qty: 28 | Refills: 0
Start: 2024-03-05 | End: 2024-04-01

## 2024-03-05 RX ORDER — IBUPROFEN 200 MG
1 TABLET ORAL
Qty: 42 | Refills: 0
Start: 2024-03-05 | End: 2024-03-18

## 2024-03-05 RX ORDER — KETOROLAC TROMETHAMINE 30 MG/ML
15 SYRINGE (ML) INJECTION ONCE
Refills: 0 | Status: DISCONTINUED | OUTPATIENT
Start: 2024-03-05 | End: 2024-03-05

## 2024-03-05 RX ORDER — ONDANSETRON 8 MG/1
1 TABLET, FILM COATED ORAL
Qty: 1 | Refills: 0
Start: 2024-03-05 | End: 2024-03-07

## 2024-03-05 RX ADMIN — Medication 15 MILLIGRAM(S): at 03:46

## 2024-03-05 RX ADMIN — Medication 15 MILLIGRAM(S): at 01:35

## 2024-03-05 NOTE — ED PROVIDER NOTE - PROGRESS NOTE DETAILS
Patient endorses improvement in pain.  Resting comfortably in bed after analgesia.  For the right 2 mm in the distal ureter.  Moderate hydronephrosis.  Patient informed of these.  At this time will be discharged home with ibuprofen, Flomax, and Zofran for nausea.  Patient amenable to this plan.

## 2024-03-05 NOTE — ED PROVIDER NOTE - OBJECTIVE STATEMENT
Kelsey Banks is a 49 y.o. F PMHx significant for HLD, left ovarian cyst, left oophorectomy, who presents to the ED with C.O. right flank pain rating to the RLQ onset approximately 9:30 AM this morning.  Patient endorses radiating pain from the right flank to the groin becoming worse throughout the day.  She says pain has been intermittent with minimal relief using OTC Tylenol. Denies CP, SOB, however does state having darker colored urine since the onset of symptoms.  No other complaints reported.

## 2024-03-05 NOTE — ED PROVIDER NOTE - NSFOLLOWUPINSTRUCTIONS_ED_ALL_ED_FT
You presented to the emergency department for right flank and right lower abdominal pain.  CT scan showed you have a 2 mm kidney stone in the distal ureter.  You were provided pain medication with improvement of your pain.  Given the size of your stone, this should pass on its own.  We are providing ibuprofen for pain relief as well as Flomax that will help you pass the stone.  Please take the ibuprofen with food as this can be unsettling with your stomach.     Please follow-up with your primary care doctor within the next 1 to 3 days.  If you develop fever, chills, chest pain, shortness of breath, worsening pain, or for any other questions or concerns    Kidney Stones    WHAT YOU NEED TO KNOW:    What is a kidney stone? Kidney stones form in the urinary system when the water and waste in your urine are out of balance. When this happens, certain types of waste crystals separate from the urine. The crystals build up and form kidney stones. Kidney stones can be made of uric acid, calcium, phosphate, or oxalate crystals. You may have more than one kidney stone.  Kidney Stones     What increases my risk for kidney stones?    Not drinking enough liquids (especially water) each day    Having urinary tract infections often    Too much of certain foods, such as meat, salt, nuts, and chocolate    Obesity    Certain medicines, such as diuretics, steroids, and antacids    A family history of kidney stones    Being born with a kidney or bowel disorder  What are the signs and symptoms of kidney stones?    Pain in the middle of your back that moves across to your side or that may spread to your groin    Nausea and vomiting    Urge to urinate often, burning feeling when you urinate, or pink or red urine    Tenderness in your lower back, side, or stomach  How are kidney stones diagnosed? Your healthcare provider will ask about your health and usual foods. He or she may refer you to a urologist. You may need tests to find out what type of kidney stones you have. Tests can show the size of your kidney stones and where they are in your urinary system. You may need more than one of the following:    Urine tests may show if you have blood in your urine. They may also show high amounts of the substances that form kidney stones, such as uric acid.    Blood tests show how well your kidneys are working. They may also be used to check the levels of calcium or uric acid in your blood.    X-ray or ultrasound pictures may be taken of your kidneys, bladder, and ureters. You may be given contrast liquid before an x-ray to help these show up better in the pictures. You may need to have more than one x-ray. Tell the healthcare provider if you have ever had an allergic reaction to contrast liquid.  How are kidney stones treated?    NSAIDs, such as ibuprofen, help decrease swelling, pain, and fever. This medicine is available with or without a doctor's order. NSAIDs can cause stomach bleeding or kidney problems in certain people. If you take blood thinner medicine, always ask your healthcare provider if NSAIDs are safe for you. Always read the medicine label and follow directions.    Acetaminophen decreases pain and fever. It is available without a doctor's order. Ask how much to take and how often to take it. Follow directions. Read the labels of all other medicines you are using to see if they also contain acetaminophen, or ask your doctor or pharmacist. Acetaminophen can cause liver damage if not taken correctly.    Prescription pain medicine may be given. Ask your healthcare provider how to take this medicine safely. Some prescription pain medicines contain acetaminophen. Do not take other medicines that contain acetaminophen without talking to your healthcare provider. Too much acetaminophen may cause liver damage. Prescription pain medicine may cause constipation. Ask your healthcare provider how to prevent or treat constipation.    Medicines to balance your electrolytes may be needed.    A procedure or surgery to remove the kidney stones may be needed if they do not pass on their own. Your treatment will depend on the size and location of your kidney stones.  What can I do to manage kidney stones?    Drink more liquids. Your healthcare provider may tell you to drink at least 8 to 12 (eight-ounce) cups of liquids each day. This helps flush out the kidney stones when you urinate. Water is the best liquid to drink.    Strain your urine every time you go to the bathroom. Urinate through a strainer or a piece of thin cloth to catch the stones. Take the stones to your healthcare provider so they can be sent to the lab for tests. This will help your healthcare providers plan the best treatment for you.  Look for Stones in the Filter      Ask if you should avoid any foods. You may need to limit oxalate. Oxalate is a chemical found in some plant foods. The most common type of kidney stone is made up of crystals that contain calcium and oxalate. Your healthcare provider or dietitian may recommend that you limit oxalate if you get this type of kidney stone often. You may need to limit how much sodium (salt) or protein you eat. Ask for information about the best foods for you.  High Oxalate Foods      Be physically active as directed. Your stones may pass more easily if you stay active. Physical activity can also help you manage your weight. Ask about the best activities for you.   Family Walking for Exercise  When should I seek immediate care?    You are vomiting and it is not relieved with medicine.    When should I call my doctor?    You have a fever.    You have trouble urinating.    You see blood in your urine.    You have severe pain.    You have any questions or concerns about your condition or care.  CARE AGREEMENT:    You have the right to help plan your care. Learn about your health condition and how it may be treated. Discuss treatment options with your healthcare providers to decide what care you want to receive. You always have the right to refuse treatment.

## 2024-03-05 NOTE — ED PROVIDER NOTE - PATIENT PORTAL LINK FT
You can access the FollowMyHealth Patient Portal offered by [facility] by registering at the following website: [email or web address]. By joining [facility]’s FollowMyHealth portal, you will also be able to view your health information using other applications (apps) compatible with our system.

## 2024-03-05 NOTE — ED ADULT NURSE NOTE - OBJECTIVE STATEMENT
Pt is a 50yo w/ PMH left sided ovarian cyst & partial L ovary removal presents to ED c/o R-sided flank and pelvic pain, nausea, vomiting. Pt states "The pain is a 10/10, I have vomited twice since getting here, the pain comes and goes but the waves are awfully bad when they come. It feels how it felt on the other side when I had the cysts." Denies chest pain, SOB, weakness, dizziness, lightheadedness, blood in stools,  fevers/chills, sick contacts. A&Ox4. Strong peripheral pulses. Neurologically intact and follows commands. Pulse motor and sensation present and equal to all 4 extremities. Abdomen soft, nondistended, tender to palpation in RLQ. NSR on cardiac monitor. Skin warm dry intact and normal for ethnicity. Ambulatory with steady gait in ED. Stretcher locked and in lowest position, appropriate side rails up. Pt instructed to notify RN if assistance is needed.

## 2024-03-05 NOTE — ED PROVIDER NOTE - CLINICAL SUMMARY MEDICAL DECISION MAKING FREE TEXT BOX
Patient is a 49-year-old female presenting with right flank pain radiating into the right groin onset approximately 9:30 AM this morning.  Given HPI and physical exam findings concern differentials include but not limited to UTI, kidney stone, pyelonephritis.  Given symptoms, will obtain CT abdomen pelvis to evaluate for likely kidney stone given HPI and PE.  Will provide analgesia as well as obtain basic blood work with CBC, CMP, UA.  Will reevaluate.  Patient likely to be discharged pending results of CT indicates possible stone 49-year-old female presenting with right flank pain radiating into the right groin onset approximately 9:30 AM this morning.  Given HPI and physical exam findings, differential includes but not limited to UTI, kidney stone, pyelonephritis. Less likely pelvic source for pain. HDS in triage, hypertensive, afebrile. Given symptoms, will obtain CT abdomen pelvis to evaluate for likely kidney stone.  Will provide analgesia as well as obtain basic blood work with CBC, CMP, UA.  Will reevaluate.

## 2024-03-05 NOTE — ED PROVIDER NOTE - PHYSICAL EXAMINATION
GEN: Patient awake and alert. moderate acute distress, non-toxic.  CARDIAC: RRR. Normal. No murmur, rubs, or gallops  PULM: CTA B/L no wheeze, rales or rhonchi. No signs of respiratory distress, no accessory muscle usage or nasal flaring.  ABD: Soft, tender RLQ, nondistended. No rebound, no involuntary guarding. BS x 4, no auscultated bruit. No HSM appreciated.  : No CVA tenderness, no suprapubic tenderness.  MSK: Moving all extremities spontaneously. 5/5 strength and full ROM in all extremities. No obvious deformity.  NEURO: A&Ox3, no focal neurological deficits, CN 2-12 grossly intact. Following simple commands. FTN and HTS coordination intact. Gait normal.  SKIN: Warm, dry, no rash, no lesions, no open wounds. No urticaria. GEN: Patient awake and alert. moderate acute distress, non-toxic.  CARDIAC: RRR. Normal. No murmur, rubs, or gallops  PULM: CTA B/L no wheeze, rales or rhonchi. No signs of respiratory distress, no accessory muscle usage or nasal flaring.  ABD: Soft, tender RLQ, nondistended. No rebound, no involuntary guarding. BS x 4, no auscultated bruit. No HSM appreciated.  : No CVA tenderness, no suprapubic tenderness.  MSK: Moving all extremities spontaneously.  NEURO: A&Ox3  SKIN: Warm, dry GEN: Patient awake and alert. moderate acute distress, non-toxic.  CARDIAC: RRR. Normal. No murmur, rubs, or gallops  PULM: CTA B/L no wheeze, rales or rhonchi. No signs of respiratory distress, no accessory muscle usage or nasal flaring.  ABD: Soft, tender RLQ, nondistended.  : No CVA tenderness, no suprapubic tenderness.  MSK: Moving all extremities spontaneously.  NEURO: A&Ox3  SKIN: Warm, dry

## 2024-03-05 NOTE — ED PROVIDER NOTE - ATTENDING CONTRIBUTION TO CARE
I, Faheem Fortune, have performed a history and physical exam on this patient, and discussed their management with the resident. I have fully participated in the care of this patient. I agree with the history, physical exam, and plan as documented by the resident

## 2024-03-06 LAB
CULTURE RESULTS: SIGNIFICANT CHANGE UP
SPECIMEN SOURCE: SIGNIFICANT CHANGE UP

## 2024-03-06 NOTE — ED POST DISCHARGE NOTE - RESULT SUMMARY
Endometrial thickening to 8mm, rec GYN f/u if postmenopausal. Calling to informed pt of this result as does not appear it was conveyed to her. Has f/u with OBGYN in system scheduled for next month but would inform of result. - RYANNE StonerC Endometrial thickening to 8mm, rec GYN f/u if postmenopausal. Calling to informed pt of this result as does not appear it was conveyed to her. Has f/u with OBGYN in system scheduled for next month but would inform of result.

## 2024-03-06 NOTE — ED POST DISCHARGE NOTE - DETAILS
3/6/24: No answer, left voice message for pt to call back admin line. - Joe Lazo PA-C 3/7/24 Patel GREEN- attempted to contact patent, no answer, left v/m to call back admin line. 3/7/24 Patel PEARL pt called back ,informed her of result. pt states the ED doctor had explained to her. pt is still menstruating but patient will bring it up to her ob/gyn at her upcoming visit.

## 2024-03-08 ENCOUNTER — EMERGENCY (EMERGENCY)
Facility: HOSPITAL | Age: 49
LOS: 1 days | Discharge: HOME | End: 2024-03-08
Attending: STUDENT IN AN ORGANIZED HEALTH CARE EDUCATION/TRAINING PROGRAM
Payer: COMMERCIAL

## 2024-03-08 VITALS
DIASTOLIC BLOOD PRESSURE: 86 MMHG | TEMPERATURE: 98 F | SYSTOLIC BLOOD PRESSURE: 137 MMHG | RESPIRATION RATE: 19 BRPM | OXYGEN SATURATION: 100 % | HEART RATE: 82 BPM

## 2024-03-08 VITALS
RESPIRATION RATE: 18 BRPM | SYSTOLIC BLOOD PRESSURE: 162 MMHG | DIASTOLIC BLOOD PRESSURE: 101 MMHG | HEIGHT: 61 IN | HEART RATE: 97 BPM | OXYGEN SATURATION: 100 % | WEIGHT: 147.93 LBS | TEMPERATURE: 98 F

## 2024-03-08 LAB
ALBUMIN SERPL ELPH-MCNC: 4 G/DL — SIGNIFICANT CHANGE UP (ref 3.3–5)
ALP SERPL-CCNC: 46 U/L — SIGNIFICANT CHANGE UP (ref 40–120)
ALT FLD-CCNC: 13 U/L — SIGNIFICANT CHANGE UP (ref 10–45)
ANION GAP SERPL CALC-SCNC: 13 MMOL/L — SIGNIFICANT CHANGE UP (ref 5–17)
APPEARANCE UR: CLEAR — SIGNIFICANT CHANGE UP
AST SERPL-CCNC: 20 U/L — SIGNIFICANT CHANGE UP (ref 10–40)
BACTERIA # UR AUTO: NEGATIVE /HPF — SIGNIFICANT CHANGE UP
BASOPHILS # BLD AUTO: 0.03 K/UL — SIGNIFICANT CHANGE UP (ref 0–0.2)
BASOPHILS NFR BLD AUTO: 0.2 % — SIGNIFICANT CHANGE UP (ref 0–2)
BILIRUB SERPL-MCNC: 0.3 MG/DL — SIGNIFICANT CHANGE UP (ref 0.2–1.2)
BILIRUB UR-MCNC: NEGATIVE — SIGNIFICANT CHANGE UP
BUN SERPL-MCNC: 15 MG/DL — SIGNIFICANT CHANGE UP (ref 7–23)
CALCIUM SERPL-MCNC: 8.8 MG/DL — SIGNIFICANT CHANGE UP (ref 8.4–10.5)
CAST: 0 /LPF — SIGNIFICANT CHANGE UP (ref 0–4)
CHLORIDE SERPL-SCNC: 105 MMOL/L — SIGNIFICANT CHANGE UP (ref 96–108)
CO2 SERPL-SCNC: 21 MMOL/L — LOW (ref 22–31)
COLOR SPEC: YELLOW — SIGNIFICANT CHANGE UP
CREAT SERPL-MCNC: 0.95 MG/DL — SIGNIFICANT CHANGE UP (ref 0.5–1.3)
DIFF PNL FLD: ABNORMAL
EGFR: 73 ML/MIN/1.73M2 — SIGNIFICANT CHANGE UP
EOSINOPHIL # BLD AUTO: 0.05 K/UL — SIGNIFICANT CHANGE UP (ref 0–0.5)
EOSINOPHIL NFR BLD AUTO: 0.4 % — SIGNIFICANT CHANGE UP (ref 0–6)
GLUCOSE SERPL-MCNC: 94 MG/DL — SIGNIFICANT CHANGE UP (ref 70–99)
GLUCOSE UR QL: NEGATIVE MG/DL — SIGNIFICANT CHANGE UP
HCT VFR BLD CALC: 38.6 % — SIGNIFICANT CHANGE UP (ref 34.5–45)
HGB BLD-MCNC: 12.1 G/DL — SIGNIFICANT CHANGE UP (ref 11.5–15.5)
IMM GRANULOCYTES NFR BLD AUTO: 0.4 % — SIGNIFICANT CHANGE UP (ref 0–0.9)
KETONES UR-MCNC: NEGATIVE MG/DL — SIGNIFICANT CHANGE UP
LEUKOCYTE ESTERASE UR-ACNC: NEGATIVE — SIGNIFICANT CHANGE UP
LYMPHOCYTES # BLD AUTO: 1.77 K/UL — SIGNIFICANT CHANGE UP (ref 1–3.3)
LYMPHOCYTES # BLD AUTO: 13.4 % — SIGNIFICANT CHANGE UP (ref 13–44)
MCHC RBC-ENTMCNC: 24.4 PG — LOW (ref 27–34)
MCHC RBC-ENTMCNC: 31.3 GM/DL — LOW (ref 32–36)
MCV RBC AUTO: 77.8 FL — LOW (ref 80–100)
MONOCYTES # BLD AUTO: 0.45 K/UL — SIGNIFICANT CHANGE UP (ref 0–0.9)
MONOCYTES NFR BLD AUTO: 3.4 % — SIGNIFICANT CHANGE UP (ref 2–14)
NEUTROPHILS # BLD AUTO: 10.83 K/UL — HIGH (ref 1.8–7.4)
NEUTROPHILS NFR BLD AUTO: 82.2 % — HIGH (ref 43–77)
NITRITE UR-MCNC: NEGATIVE — SIGNIFICANT CHANGE UP
NRBC # BLD: 0 /100 WBCS — SIGNIFICANT CHANGE UP (ref 0–0)
PH UR: 6 — SIGNIFICANT CHANGE UP (ref 5–8)
PLATELET # BLD AUTO: 250 K/UL — SIGNIFICANT CHANGE UP (ref 150–400)
POTASSIUM SERPL-MCNC: 3.9 MMOL/L — SIGNIFICANT CHANGE UP (ref 3.5–5.3)
POTASSIUM SERPL-SCNC: 3.9 MMOL/L — SIGNIFICANT CHANGE UP (ref 3.5–5.3)
PROT SERPL-MCNC: 6.9 G/DL — SIGNIFICANT CHANGE UP (ref 6–8.3)
PROT UR-MCNC: NEGATIVE MG/DL — SIGNIFICANT CHANGE UP
RBC # BLD: 4.96 M/UL — SIGNIFICANT CHANGE UP (ref 3.8–5.2)
RBC # FLD: 13.7 % — SIGNIFICANT CHANGE UP (ref 10.3–14.5)
RBC CASTS # UR COMP ASSIST: 4 /HPF — SIGNIFICANT CHANGE UP (ref 0–4)
SODIUM SERPL-SCNC: 139 MMOL/L — SIGNIFICANT CHANGE UP (ref 135–145)
SP GR SPEC: 1.01 — SIGNIFICANT CHANGE UP (ref 1–1.03)
SQUAMOUS # UR AUTO: 1 /HPF — SIGNIFICANT CHANGE UP (ref 0–5)
UROBILINOGEN FLD QL: 0.2 MG/DL — SIGNIFICANT CHANGE UP (ref 0.2–1)
WBC # BLD: 13.18 K/UL — HIGH (ref 3.8–10.5)
WBC # FLD AUTO: 13.18 K/UL — HIGH (ref 3.8–10.5)
WBC UR QL: 1 /HPF — SIGNIFICANT CHANGE UP (ref 0–5)

## 2024-03-08 PROCEDURE — 99284 EMERGENCY DEPT VISIT MOD MDM: CPT

## 2024-03-08 PROCEDURE — 81001 URINALYSIS AUTO W/SCOPE: CPT

## 2024-03-08 PROCEDURE — 85025 COMPLETE CBC W/AUTO DIFF WBC: CPT

## 2024-03-08 PROCEDURE — 96374 THER/PROPH/DIAG INJ IV PUSH: CPT

## 2024-03-08 PROCEDURE — 96376 TX/PRO/DX INJ SAME DRUG ADON: CPT

## 2024-03-08 PROCEDURE — 80053 COMPREHEN METABOLIC PANEL: CPT

## 2024-03-08 PROCEDURE — 99284 EMERGENCY DEPT VISIT MOD MDM: CPT | Mod: 25

## 2024-03-08 PROCEDURE — 87086 URINE CULTURE/COLONY COUNT: CPT

## 2024-03-08 PROCEDURE — 96375 TX/PRO/DX INJ NEW DRUG ADDON: CPT

## 2024-03-08 RX ORDER — OXYCODONE HYDROCHLORIDE 5 MG/1
1 TABLET ORAL
Qty: 12 | Refills: 0
Start: 2024-03-08 | End: 2024-03-11

## 2024-03-08 RX ORDER — MORPHINE SULFATE 50 MG/1
4 CAPSULE, EXTENDED RELEASE ORAL ONCE
Refills: 0 | Status: DISCONTINUED | OUTPATIENT
Start: 2024-03-08 | End: 2024-03-08

## 2024-03-08 RX ORDER — SODIUM CHLORIDE 9 MG/ML
1000 INJECTION, SOLUTION INTRAVENOUS ONCE
Refills: 0 | Status: COMPLETED | OUTPATIENT
Start: 2024-03-08 | End: 2024-03-08

## 2024-03-08 RX ORDER — KETOROLAC TROMETHAMINE 30 MG/ML
15 SYRINGE (ML) INJECTION ONCE
Refills: 0 | Status: DISCONTINUED | OUTPATIENT
Start: 2024-03-08 | End: 2024-03-08

## 2024-03-08 RX ADMIN — SODIUM CHLORIDE 1000 MILLILITER(S): 9 INJECTION, SOLUTION INTRAVENOUS at 03:39

## 2024-03-08 RX ADMIN — MORPHINE SULFATE 4 MILLIGRAM(S): 50 CAPSULE, EXTENDED RELEASE ORAL at 04:38

## 2024-03-08 RX ADMIN — MORPHINE SULFATE 4 MILLIGRAM(S): 50 CAPSULE, EXTENDED RELEASE ORAL at 03:39

## 2024-03-08 RX ADMIN — Medication 15 MILLIGRAM(S): at 04:38

## 2024-03-08 NOTE — ED PROVIDER NOTE - NSFOLLOWUPCLINICS_GEN_ALL_ED_FT
[facility]  [address] [place] [address]  Phone: [phone]  Fax:

## 2024-03-08 NOTE — ED PROVIDER NOTE - PHYSICAL EXAMINATION
CONSTITUTIONAL: awake, uncomfortable appearing.   SKIN: warm, dry,  no rash  HEAD: Normocephalic; atraumatic.  EYES: no conjunctival injection. no scleral icterus  ENT: No nasal discharge; airway clear.  CARD: S1, S2 normal; no murmurs, gallops, or rubs. Regular rate and rhythm.   RESP: No wheezes, rales or rhonchi. Good air movement bilaterally.   ABD: soft ntnd, no guarding, no distention, no rigidity. R cva tender   EXT:  No cyanosis or edema.   NEURO: Alert, oriented, grossly unremarkable  PSYCH: Cooperative, appropriate.

## 2024-03-08 NOTE — ED PROVIDER NOTE - NSFOLLOWUPINSTRUCTIONS_ED_ALL_ED_FT
You were seen in the ED for kidney stone pain    Your evaluation showed no findings requiring further evaluation or treatment in the hospital at this time.    Please follow up with your urologist as discussed within 1 week.  You should expect a call within 1-2 business days schedule an appointment.    You may take Tylenol up to 1000mg every 6 hours as needed for pain.  You may take Ibuprofen up to 400mg every 6 hours as needed for pain.  You were prescribed a medication called oxycodone, for severe pain as needed.  You may take up to 1 tablet every 8 hours as needed for severe pain.  You may need to take a stool softener such as MiraLAX when taking this medication.    Seek immediate medical attention if you experience new or worsening symptoms. You were seen in the ED for kidney stone pain    Your evaluation showed no findings requiring further evaluation or treatment in the hospital at this time.    Please follow up with your urologist as discussed within 1 week.  You should expect a call within 1-2 business days schedule an appointment.    You may take Tylenol up to 1000mg every 6 hours as needed for pain.  You may take Ibuprofen up to 400mg every 6 hours as needed for pain.  You were prescribed a medication called oxycodone, for severe pain as needed.  You may take up to 1 tablet every 8 hours as needed for severe pain.  You may need to take a stool softener such as MiraLAX when taking this medication. Continue taking flomax as previously directed.    Seek immediate medical attention if you experience new or worsening symptoms.

## 2024-03-08 NOTE — ED ADULT NURSE NOTE - OBJECTIVE STATEMENT
49Y female, PMH of hyperlipidemia, left ovarian cyst, left oophorectomy, right-sided kidney stones. pt presents to the ED c/o right flank pain.  Patient was here 3 days ago with right flank pain and was found on CT scan to have a kidney stone.  States that at home she is been taking Motrin, Flomax as instructed, with some relief.  States that last night she began to have recurrence of severe pain similar to initial onset of pain.  Quality is unchanged from presentation initially, now constant, not worse with position, not associated with nausea or vomiting. Feels that the pain now radiates down to her right groin area from her right flank.  No fevers, now without hematuria, no dysuria.

## 2024-03-08 NOTE — ED PROVIDER NOTE - PATIENT PORTAL LINK FT
You can access the FollowMyHealth Patient Portal offered by [facility] by registering at the following website: [email or web address]. By joining [facility]’s FollowMyHealth portal, you will also be able to view your health information using other applications (apps) compatible with our system.

## 2024-03-08 NOTE — ED PROVIDER NOTE - OBJECTIVE STATEMENT
Patient is a 49-year-old female past medical history significant for hyperlipidemia, left ovarian cyst, left oophorectomy, right-sided kidney stones presenting for right flank pain.  Patient was here 3 days ago with right flank pain and was found on CT scan to have a distal ureteral stone was given instructions for analgesia, Flomax, discharged.  States that at home she is been taking Motrin, Flomax as instructed, with some relief.  States that last night she began to have recurrence of severe pain similar to initial onset of pain.  Quality is unchanged from presentation initially, now constant, not worse with position, not associated with nausea or vomiting. Feels that the pain now radiates down to her right groin area from her right flank.  No fevers, now without hematuria, no dysuria.

## 2024-03-08 NOTE — ED PROVIDER NOTE - ATTENDING CONTRIBUTION TO CARE
Attending (Matthew Peña D.O.):  I have personally seen and examined this patient. I have performed a substantive portion of the visit including all aspects of the medical decision making. Resident, fellow, student, and/or ACP note reviewed. I agree on the plan of care except where noted.    49F here for continued colicky right flank pain in setting of known 2mm R uvj stone with mild-mod hydro w/o superimposed urine infection. Denies new trauma. + intermittent pain since discharge from last visit but not as severe as today. Denies fever, chills. Hemodynam stable except hypertensive 2/2 pain. +right cva ttp, benign abd, no peritoneal signs. No rash, no jaundice. Steady gait. Clear lungs. Hx and exam favors continued pain though not with max MET trialed as no home opiate analgesia. Eval for eloy, new infective process. Do not think repeat imaging is required at this time if labs wnl and pain improved. Discussed with patient, agrees. Plan for labs, UA, analgesia. -> analgesia redosed with multimodal pain therapy -> pain improved, no cva ttp. Able to urinate. Labs nonactionable. Cr slight elevated to 0.9 from 0.7 but tolerating PO, likely in setting of ibuprofen use at home. All discussed. oxy sent to pts pharmacy with rapid uro f/u placed for call center f/u. Patient is hemodynamically stable, feels improved. All w/u, results discussed at length w/ patient. All questions answered. Strict return precautions provided w/ verbal understanding expressed. Stable for dc w/ close outpt f/u.

## 2024-03-08 NOTE — ED PROVIDER NOTE - PROGRESS NOTE DETAILS
Thomas Boswell MD PGY2: Labs nonactionable, pain improved with meds. Urine pending. Thomas Boswell MD PGY2: urine nonactionable. Discussed dc, follow up, meds, return precautions. Understands and is amenable at this time.

## 2024-03-08 NOTE — ED PROVIDER NOTE - CLINICAL SUMMARY MEDICAL DECISION MAKING FREE TEXT BOX
Patient is a 49-year-old female past medical history significant for hyperlipidemia, left ovarian cyst, left oophorectomy, right-sided kidney stones presenting for right flank pain. With vital signs hypertensive to 160s over 100s otherwise within normal limits and stable.  With recurrence of right flank pain with known right-sided kidney stone from 3 days ago, without fevers or dysuria.  Likely with ongoing renal colic, unlikely represent pyelonephritis, unlikely represent other intra-abdominal etiology including colitis or appendicitis.  Plan for labs and urine, give analgesia, reassess.

## 2024-03-09 LAB
CULTURE RESULTS: SIGNIFICANT CHANGE UP
SPECIMEN SOURCE: SIGNIFICANT CHANGE UP

## 2024-03-11 ENCOUNTER — APPOINTMENT (OUTPATIENT)
Dept: UROLOGY | Facility: CLINIC | Age: 49
End: 2024-03-11
Payer: COMMERCIAL

## 2024-03-11 VITALS
WEIGHT: 145 LBS | SYSTOLIC BLOOD PRESSURE: 149 MMHG | TEMPERATURE: 97.2 F | OXYGEN SATURATION: 98 % | HEIGHT: 61 IN | BODY MASS INDEX: 27.38 KG/M2 | HEART RATE: 78 BPM | DIASTOLIC BLOOD PRESSURE: 90 MMHG

## 2024-03-11 DIAGNOSIS — R10.9 UNSPECIFIED ABDOMINAL PAIN: ICD-10-CM

## 2024-03-11 PROCEDURE — 99204 OFFICE O/P NEW MOD 45 MIN: CPT

## 2024-03-11 PROCEDURE — G2211 COMPLEX E/M VISIT ADD ON: CPT

## 2024-03-11 RX ORDER — OXYCODONE 5 MG/1
5 TABLET ORAL
Qty: 12 | Refills: 0 | Status: ACTIVE | COMMUNITY
Start: 2024-03-11 | End: 1900-01-01

## 2024-03-11 NOTE — HISTORY OF PRESENT ILLNESS
[FreeTextEntry1] : 49-year-old female seen in the ED for severe right flank pain which has been intermittent over the past week.  Her CT scan demonstrated a 3 mm right UVJ stone with hydronephrosis.  This is her first stone event.  She denies all urinary symptoms.

## 2024-03-11 NOTE — PHYSICAL EXAM
[Well Groomed] : well groomed [Normal Appearance] : normal appearance [General Appearance - In No Acute Distress] : no acute distress [Edema] : no peripheral edema [Respiration, Rhythm And Depth] : normal respiratory rhythm and effort [Exaggerated Use Of Accessory Muscles For Inspiration] : no accessory muscle use [Abdomen Tenderness] : non-tender [Abdomen Soft] : soft [Costovertebral Angle Tenderness] : no ~M costovertebral angle tenderness [Urinary Bladder Findings] : the bladder was normal on palpation [Normal Station and Gait] : the gait and station were normal for the patient's age [] : no rash [No Focal Deficits] : no focal deficits [Oriented To Time, Place, And Person] : oriented to person, place, and time [No Palpable Adenopathy] : no palpable adenopathy [Mood] : the mood was normal [Affect] : the affect was normal

## 2024-03-11 NOTE — ASSESSMENT
[FreeTextEntry1] : 49-year-old female with a 3 mm right UVJ stone.  She will continue tamsulosin 0.4 mg daily for medical expulsive therapy and follow-up in 3 weeks for renal ultrasound  Plan Urinalysis Urine culture Reviewed images of patient's CT abdomen pelvis and discussed results with the patient demonstrating a 3 mm UVJ stone Continue tamsulosin 0.4 mg daily NSAIDs and Tylenol for pain control with oxycodone 5 mg as needed: Prescription sent Follow-up in 3 weeks for renal ultrasound   Patient is being seen today for evaluation and management of a chronic and longitudinal ongoing condition and I am of the primary treating physician.

## 2024-03-12 LAB
APPEARANCE: CLEAR
BACTERIA: ABNORMAL /HPF
BILIRUBIN URINE: NEGATIVE
BLOOD URINE: NEGATIVE
CAST: 0 /LPF
COLOR: YELLOW
EPITHELIAL CELLS: 1 /HPF
GLUCOSE QUALITATIVE U: NEGATIVE MG/DL
KETONES URINE: NEGATIVE MG/DL
LEUKOCYTE ESTERASE URINE: NEGATIVE
MICROSCOPIC-UA: NORMAL
NITRITE URINE: NEGATIVE
PH URINE: 6
PROTEIN URINE: NEGATIVE MG/DL
RED BLOOD CELLS URINE: 1 /HPF
SPECIFIC GRAVITY URINE: 1.02
UROBILINOGEN URINE: 0.2 MG/DL
WHITE BLOOD CELLS URINE: 0 /HPF

## 2024-03-14 RX ORDER — IBUPROFEN 600 MG/1
600 TABLET, FILM COATED ORAL 3 TIMES DAILY
Qty: 30 | Refills: 0 | Status: ACTIVE | COMMUNITY
Start: 2024-03-14 | End: 1900-01-01

## 2024-03-15 DIAGNOSIS — N39.0 URINARY TRACT INFECTION, SITE NOT SPECIFIED: ICD-10-CM

## 2024-03-15 LAB — BACTERIA UR CULT: ABNORMAL

## 2024-03-15 RX ORDER — CIPROFLOXACIN HYDROCHLORIDE 500 MG/1
500 TABLET, FILM COATED ORAL
Qty: 14 | Refills: 0 | Status: ACTIVE | COMMUNITY
Start: 2024-03-15 | End: 1900-01-01

## 2024-03-18 ENCOUNTER — APPOINTMENT (OUTPATIENT)
Dept: UROLOGY | Facility: CLINIC | Age: 49
End: 2024-03-18

## 2024-03-21 DIAGNOSIS — R92.30 DENSE BREASTS, UNSPECIFIED: ICD-10-CM

## 2024-03-21 DIAGNOSIS — Z12.31 ENCOUNTER FOR SCREENING MAMMOGRAM FOR MALIGNANT NEOPLASM OF BREAST: ICD-10-CM

## 2024-03-31 LAB
CYTOLOGY CVX/VAG DOC THIN PREP: NORMAL
HPV HIGH+LOW RISK DNA PNL CVX: NOT DETECTED

## 2024-04-04 ENCOUNTER — APPOINTMENT (OUTPATIENT)
Dept: UROLOGY | Facility: CLINIC | Age: 49
End: 2024-04-04
Payer: COMMERCIAL

## 2024-04-04 VITALS
HEART RATE: 76 BPM | BODY MASS INDEX: 27.19 KG/M2 | SYSTOLIC BLOOD PRESSURE: 122 MMHG | DIASTOLIC BLOOD PRESSURE: 84 MMHG | OXYGEN SATURATION: 100 % | HEIGHT: 61 IN | WEIGHT: 144 LBS | TEMPERATURE: 97.9 F

## 2024-04-04 DIAGNOSIS — N20.0 CALCULUS OF KIDNEY: ICD-10-CM

## 2024-04-04 PROCEDURE — 99214 OFFICE O/P EST MOD 30 MIN: CPT

## 2024-04-04 PROCEDURE — G2211 COMPLEX E/M VISIT ADD ON: CPT

## 2024-04-04 PROCEDURE — 76775 US EXAM ABDO BACK WALL LIM: CPT

## 2024-04-04 NOTE — HISTORY OF PRESENT ILLNESS
[FreeTextEntry1] : 49-year-old female seen in the ED for severe right flank pain which has been intermittent over the past week.  Her CT scan demonstrated a 3 mm right UVJ stone with hydronephrosis.  This is her first stone event.  She is here today for renal ultrasound which did not demonstrate any hydronephrosis however the patient continues to have right-sided intermittent flank pain.  Her urine culture from her last appointment was positive and she was treated appropriate with antibiotics.  She currently has no urinary symptoms.

## 2024-04-04 NOTE — ASSESSMENT
[FreeTextEntry1] : 49-year-old female with a 3 mm right UVJ stone. Her renal us was negative for hydro however she continues to have flank discomfort. Will get CT abd/pelvis to confirm stone has passed. She was also treated for a uti with abx.   Plan Urinalysis Urine culture  I have reviewed images of patient's renal us and discussed results with patient demonstrating no hydronephrosis CT abd/pelvis non con Will call with results and if stone remains will discuss surgical intervention   Patient is being seen today for evaluation and management of a chronic and longitudinal ongoing condition and I am of the primary treating physician.

## 2024-04-05 LAB
APPEARANCE: CLEAR
BACTERIA: NEGATIVE /HPF
BILIRUBIN URINE: NEGATIVE
BLOOD URINE: NEGATIVE
CAST: 0 /LPF
COLOR: YELLOW
EPITHELIAL CELLS: 3 /HPF
GLUCOSE QUALITATIVE U: NEGATIVE MG/DL
KETONES URINE: NEGATIVE MG/DL
LEUKOCYTE ESTERASE URINE: NEGATIVE
MICROSCOPIC-UA: NORMAL
NITRITE URINE: NEGATIVE
PH URINE: 5.5
PROTEIN URINE: NEGATIVE MG/DL
RED BLOOD CELLS URINE: 2 /HPF
SPECIFIC GRAVITY URINE: 1.02
UROBILINOGEN URINE: 0.2 MG/DL
WHITE BLOOD CELLS URINE: 0 /HPF

## 2024-04-08 ENCOUNTER — RESULT REVIEW (OUTPATIENT)
Age: 49
End: 2024-04-08

## 2024-04-08 LAB — BACTERIA UR CULT: NORMAL

## 2024-04-11 ENCOUNTER — APPOINTMENT (OUTPATIENT)
Dept: CT IMAGING | Facility: IMAGING CENTER | Age: 49
End: 2024-04-11
Payer: COMMERCIAL

## 2024-04-11 ENCOUNTER — OUTPATIENT (OUTPATIENT)
Dept: OUTPATIENT SERVICES | Facility: HOSPITAL | Age: 49
LOS: 1 days | End: 2024-04-11
Payer: COMMERCIAL

## 2024-04-11 DIAGNOSIS — N20.0 CALCULUS OF KIDNEY: ICD-10-CM

## 2024-04-11 PROCEDURE — 74176 CT ABD & PELVIS W/O CONTRAST: CPT

## 2024-04-11 PROCEDURE — 74176 CT ABD & PELVIS W/O CONTRAST: CPT | Mod: 26

## 2024-04-24 ENCOUNTER — APPOINTMENT (OUTPATIENT)
Dept: OBGYN | Facility: CLINIC | Age: 49
End: 2024-04-24
Payer: COMMERCIAL

## 2024-04-24 VITALS
DIASTOLIC BLOOD PRESSURE: 74 MMHG | WEIGHT: 146 LBS | BODY MASS INDEX: 27.56 KG/M2 | HEIGHT: 61 IN | SYSTOLIC BLOOD PRESSURE: 110 MMHG

## 2024-04-24 DIAGNOSIS — Z01.411 ENCOUNTER FOR GYNECOLOGICAL EXAMINATION (GENERAL) (ROUTINE) WITH ABNORMAL FINDINGS: ICD-10-CM

## 2024-04-24 DIAGNOSIS — Z11.51 ENCOUNTER FOR SCREENING FOR HUMAN PAPILLOMAVIRUS (HPV): ICD-10-CM

## 2024-04-24 DIAGNOSIS — D25.9 LEIOMYOMA OF UTERUS, UNSPECIFIED: ICD-10-CM

## 2024-04-24 DIAGNOSIS — R92.8 OTHER ABNORMAL AND INCONCLUSIVE FINDINGS ON DIAGNOSTIC IMAGING OF BREAST: ICD-10-CM

## 2024-04-24 PROCEDURE — 99396 PREV VISIT EST AGE 40-64: CPT

## 2024-04-24 PROCEDURE — 99213 OFFICE O/P EST LOW 20 MIN: CPT | Mod: 25

## 2024-04-24 NOTE — HISTORY OF PRESENT ILLNESS
[FreeTextEntry1] : 50 yo  LMP 24 presents for annual visit. Pt reports heavy bleeding days 1 and 2 of her cycle. Denies intermenstrual bleeding. Recent BIRADS 0 mammo.  OBH:  FT x2, MAB s/p D+C GYNH: TOA PMH: dyslipidemia PSH: s/p D&C, lsc KARL, RSO, left salpingectomy on 11/15/19 for TOA. FH: DM (mother, father), colon cancer (father)  [Mammogramdate] : 4/24 [PapSmeardate] : 3/23

## 2024-04-24 NOTE — END OF VISIT
[FreeTextEntry3] :    I, Magaly Malini, acted as a scribe on behalf of Dr. Jaycob Cardona on 04/24/2024.   All medical entries made by the scribe were at my, Dr. Jaycob Cardona's, direction and personally dictated by me on 04/24/2024. I have reviewed the chart and agree that the record accurately reflects my personal performance of the history, physical exam, assessment, and plan. I have also personally directed, reviewed, and agreed with the chart.

## 2024-04-24 NOTE — PHYSICAL EXAM
[Appropriately responsive] : appropriately responsive [Alert] : alert [No Acute Distress] : no acute distress [No Lymphadenopathy] : no lymphadenopathy [Soft] : soft [Non-tender] : non-tender [Non-distended] : non-distended [No HSM] : No HSM [No Lesions] : no lesions [No Mass] : no mass [Oriented x3] : oriented x3 [Examination Of The Breasts] : a normal appearance [No Masses] : no breast masses were palpable [Labia Majora] : normal [Labia Minora] : normal [Normal] : normal [Uterine Adnexae] : normal [FreeTextEntry3] : no thyromegaly [FreeTextEntry6] : bulky uterus

## 2024-04-24 NOTE — PLAN
[FreeTextEntry1] : 48 yo, annual visit, stable, heavy menses  HCM - pap done today - vit D/exercise/calcium - breast mammo utd - colon screening reviewed - f/u PCP for annual and appropriate immunizations - rto 1 year  BIRAD 0 mammo - erx diagnostic mammo - if abnormal, pt to f/u with breast surgeon  bulky uterus/history of fibroids - rx pelvic sono

## 2024-05-01 LAB
CYTOLOGY CVX/VAG DOC THIN PREP: NORMAL
HPV HIGH+LOW RISK DNA PNL CVX: NOT DETECTED

## 2024-05-13 ENCOUNTER — RESULT REVIEW (OUTPATIENT)
Age: 49
End: 2024-05-13

## 2024-05-13 ENCOUNTER — APPOINTMENT (OUTPATIENT)
Dept: ULTRASOUND IMAGING | Facility: IMAGING CENTER | Age: 49
End: 2024-05-13
Payer: COMMERCIAL

## 2024-05-13 ENCOUNTER — OUTPATIENT (OUTPATIENT)
Dept: OUTPATIENT SERVICES | Facility: HOSPITAL | Age: 49
LOS: 1 days | End: 2024-05-13
Payer: COMMERCIAL

## 2024-05-13 ENCOUNTER — APPOINTMENT (OUTPATIENT)
Dept: MAMMOGRAPHY | Facility: IMAGING CENTER | Age: 49
End: 2024-05-13
Payer: COMMERCIAL

## 2024-05-13 DIAGNOSIS — Z00.8 ENCOUNTER FOR OTHER GENERAL EXAMINATION: ICD-10-CM

## 2024-05-13 PROCEDURE — 77061 BREAST TOMOSYNTHESIS UNI: CPT | Mod: 26

## 2024-05-13 PROCEDURE — 77065 DX MAMMO INCL CAD UNI: CPT

## 2024-05-13 PROCEDURE — 77065 DX MAMMO INCL CAD UNI: CPT | Mod: 26,RT

## 2024-05-13 PROCEDURE — G0279: CPT

## 2024-05-14 DIAGNOSIS — R92.1 MAMMOGRAPHIC CALCIFICATION FOUND ON DIAGNOSTIC IMAGING OF BREAST: ICD-10-CM

## 2024-06-17 ENCOUNTER — RESULT REVIEW (OUTPATIENT)
Age: 49
End: 2024-06-17

## 2024-06-17 ENCOUNTER — APPOINTMENT (OUTPATIENT)
Dept: MAMMOGRAPHY | Facility: IMAGING CENTER | Age: 49
End: 2024-06-17
Payer: COMMERCIAL

## 2024-06-17 ENCOUNTER — OUTPATIENT (OUTPATIENT)
Dept: OUTPATIENT SERVICES | Facility: HOSPITAL | Age: 49
LOS: 1 days | End: 2024-06-17
Payer: COMMERCIAL

## 2024-06-17 DIAGNOSIS — R92.1 MAMMOGRAPHIC CALCIFICATION FOUND ON DIAGNOSTIC IMAGING OF BREAST: ICD-10-CM

## 2024-06-17 PROCEDURE — 77065 DX MAMMO INCL CAD UNI: CPT | Mod: 26,RT

## 2024-06-17 PROCEDURE — A4648: CPT

## 2024-06-17 PROCEDURE — 88305 TISSUE EXAM BY PATHOLOGIST: CPT

## 2024-06-17 PROCEDURE — 76098 X-RAY EXAM SURGICAL SPECIMEN: CPT | Mod: 26

## 2024-06-17 PROCEDURE — 77065 DX MAMMO INCL CAD UNI: CPT

## 2024-06-17 PROCEDURE — 88305 TISSUE EXAM BY PATHOLOGIST: CPT | Mod: 26

## 2024-06-17 PROCEDURE — 19081 BX BREAST 1ST LESION STRTCTC: CPT

## 2024-06-17 PROCEDURE — 19081 BX BREAST 1ST LESION STRTCTC: CPT | Mod: RT

## 2024-06-20 LAB — SURGICAL PATHOLOGY STUDY: SIGNIFICANT CHANGE UP

## 2024-07-17 ENCOUNTER — NON-APPOINTMENT (OUTPATIENT)
Age: 49
End: 2024-07-17

## 2024-09-04 DIAGNOSIS — Z86.018 PERSONAL HISTORY OF OTHER BENIGN NEOPLASM: ICD-10-CM

## 2024-10-07 ENCOUNTER — APPOINTMENT (OUTPATIENT)
Dept: ULTRASOUND IMAGING | Facility: CLINIC | Age: 49
End: 2024-10-07

## 2024-10-07 PROCEDURE — 76856 US EXAM PELVIC COMPLETE: CPT | Mod: 59

## 2024-10-07 PROCEDURE — 76830 TRANSVAGINAL US NON-OB: CPT

## 2024-10-21 ENCOUNTER — APPOINTMENT (OUTPATIENT)
Dept: UROLOGY | Facility: CLINIC | Age: 49
End: 2024-10-21
Payer: COMMERCIAL

## 2024-10-21 VITALS
HEART RATE: 77 BPM | TEMPERATURE: 97.1 F | SYSTOLIC BLOOD PRESSURE: 111 MMHG | WEIGHT: 146 LBS | DIASTOLIC BLOOD PRESSURE: 80 MMHG | RESPIRATION RATE: 16 BRPM | OXYGEN SATURATION: 96 % | BODY MASS INDEX: 26.87 KG/M2 | HEIGHT: 62 IN

## 2024-10-21 DIAGNOSIS — N20.0 CALCULUS OF KIDNEY: ICD-10-CM

## 2024-10-21 PROCEDURE — 99213 OFFICE O/P EST LOW 20 MIN: CPT

## 2024-10-21 PROCEDURE — 76775 US EXAM ABDO BACK WALL LIM: CPT

## 2024-10-21 PROCEDURE — G2211 COMPLEX E/M VISIT ADD ON: CPT

## 2024-11-14 ENCOUNTER — APPOINTMENT (OUTPATIENT)
Dept: ORTHOPEDIC SURGERY | Facility: CLINIC | Age: 49
End: 2024-11-14
Payer: COMMERCIAL

## 2024-11-14 VITALS
SYSTOLIC BLOOD PRESSURE: 124 MMHG | BODY MASS INDEX: 25.76 KG/M2 | DIASTOLIC BLOOD PRESSURE: 87 MMHG | WEIGHT: 140 LBS | OXYGEN SATURATION: 96 % | HEART RATE: 84 BPM | HEIGHT: 62 IN

## 2024-11-14 DIAGNOSIS — M17.12 UNILATERAL PRIMARY OSTEOARTHRITIS, LEFT KNEE: ICD-10-CM

## 2024-11-14 PROCEDURE — 72170 X-RAY EXAM OF PELVIS: CPT

## 2024-11-14 PROCEDURE — 73564 X-RAY EXAM KNEE 4 OR MORE: CPT | Mod: LT

## 2024-11-14 PROCEDURE — 99204 OFFICE O/P NEW MOD 45 MIN: CPT

## 2024-11-14 RX ORDER — MELOXICAM 15 MG/1
15 TABLET ORAL DAILY
Qty: 30 | Refills: 1 | Status: ACTIVE | COMMUNITY
Start: 2024-11-14 | End: 1900-01-01

## 2024-11-22 ENCOUNTER — NON-APPOINTMENT (OUTPATIENT)
Age: 49
End: 2024-11-22

## 2024-12-14 NOTE — ED ADULT TRIAGE NOTE - GLASGOW COMA SCALE: BEST MOTOR RESPONSE, MLM
- Denies alcohol use and never smoker. Denies using any other drugs.   - Lives at home with mother and other family  - Has a [employer] business ([employer]) obeys commands

## 2025-02-06 ENCOUNTER — APPOINTMENT (OUTPATIENT)
Dept: OBGYN | Facility: CLINIC | Age: 50
End: 2025-02-06
Payer: COMMERCIAL

## 2025-02-06 VITALS
HEIGHT: 62 IN | WEIGHT: 150 LBS | BODY MASS INDEX: 27.6 KG/M2 | SYSTOLIC BLOOD PRESSURE: 127 MMHG | DIASTOLIC BLOOD PRESSURE: 86 MMHG

## 2025-02-06 DIAGNOSIS — N92.6 IRREGULAR MENSTRUATION, UNSPECIFIED: ICD-10-CM

## 2025-02-06 PROCEDURE — 99213 OFFICE O/P EST LOW 20 MIN: CPT

## 2025-02-07 ENCOUNTER — APPOINTMENT (OUTPATIENT)
Dept: ULTRASOUND IMAGING | Facility: CLINIC | Age: 50
End: 2025-02-07

## 2025-02-12 ENCOUNTER — APPOINTMENT (OUTPATIENT)
Dept: ULTRASOUND IMAGING | Facility: IMAGING CENTER | Age: 50
End: 2025-02-12

## 2025-02-12 ENCOUNTER — OUTPATIENT (OUTPATIENT)
Dept: OUTPATIENT SERVICES | Facility: HOSPITAL | Age: 50
LOS: 1 days | End: 2025-02-12
Payer: COMMERCIAL

## 2025-02-12 ENCOUNTER — APPOINTMENT (OUTPATIENT)
Dept: ULTRASOUND IMAGING | Facility: IMAGING CENTER | Age: 50
End: 2025-02-12
Payer: COMMERCIAL

## 2025-02-12 DIAGNOSIS — Z00.8 ENCOUNTER FOR OTHER GENERAL EXAMINATION: ICD-10-CM

## 2025-02-12 DIAGNOSIS — D25.9 LEIOMYOMA OF UTERUS, UNSPECIFIED: ICD-10-CM

## 2025-02-12 PROCEDURE — 76830 TRANSVAGINAL US NON-OB: CPT

## 2025-02-12 PROCEDURE — 76856 US EXAM PELVIC COMPLETE: CPT | Mod: 26

## 2025-02-12 PROCEDURE — 76830 TRANSVAGINAL US NON-OB: CPT | Mod: 26

## 2025-02-12 PROCEDURE — 76856 US EXAM PELVIC COMPLETE: CPT

## 2025-05-14 ENCOUNTER — APPOINTMENT (OUTPATIENT)
Dept: OBGYN | Facility: CLINIC | Age: 50
End: 2025-05-14
Payer: COMMERCIAL

## 2025-05-14 VITALS
HEIGHT: 62 IN | DIASTOLIC BLOOD PRESSURE: 81 MMHG | WEIGHT: 153 LBS | SYSTOLIC BLOOD PRESSURE: 116 MMHG | BODY MASS INDEX: 28.16 KG/M2

## 2025-05-14 DIAGNOSIS — Z00.00 ENCOUNTER FOR GENERAL ADULT MEDICAL EXAMINATION W/OUT ABNORMAL FINDINGS: ICD-10-CM

## 2025-05-14 DIAGNOSIS — Z11.51 ENCOUNTER FOR SCREENING FOR HUMAN PAPILLOMAVIRUS (HPV): ICD-10-CM

## 2025-05-14 DIAGNOSIS — Z01.419 ENCOUNTER FOR GYNECOLOGICAL EXAMINATION (GENERAL) (ROUTINE) W/OUT ABNORMAL FINDINGS: ICD-10-CM

## 2025-05-14 PROCEDURE — 99396 PREV VISIT EST AGE 40-64: CPT

## 2025-05-16 LAB — HPV HIGH+LOW RISK DNA PNL CVX: NOT DETECTED

## 2025-05-19 LAB — CYTOLOGY CVX/VAG DOC THIN PREP: NORMAL

## 2025-06-13 ENCOUNTER — RESULT REVIEW (OUTPATIENT)
Age: 50
End: 2025-06-13

## 2025-06-13 ENCOUNTER — OUTPATIENT (OUTPATIENT)
Dept: OUTPATIENT SERVICES | Facility: HOSPITAL | Age: 50
LOS: 1 days | End: 2025-06-13
Payer: COMMERCIAL

## 2025-06-13 ENCOUNTER — APPOINTMENT (OUTPATIENT)
Dept: ULTRASOUND IMAGING | Facility: IMAGING CENTER | Age: 50
End: 2025-06-13
Payer: COMMERCIAL

## 2025-06-13 ENCOUNTER — APPOINTMENT (OUTPATIENT)
Dept: MAMMOGRAPHY | Facility: IMAGING CENTER | Age: 50
End: 2025-06-13
Payer: COMMERCIAL

## 2025-06-13 DIAGNOSIS — Z00.00 ENCOUNTER FOR GENERAL ADULT MEDICAL EXAMINATION WITHOUT ABNORMAL FINDINGS: ICD-10-CM

## 2025-06-13 DIAGNOSIS — Z00.8 ENCOUNTER FOR OTHER GENERAL EXAMINATION: ICD-10-CM

## 2025-06-13 PROCEDURE — 77063 BREAST TOMOSYNTHESIS BI: CPT | Mod: 26

## 2025-06-13 PROCEDURE — 76641 ULTRASOUND BREAST COMPLETE: CPT | Mod: 26,50

## 2025-06-13 PROCEDURE — 77067 SCR MAMMO BI INCL CAD: CPT

## 2025-06-13 PROCEDURE — 77067 SCR MAMMO BI INCL CAD: CPT | Mod: 26

## 2025-06-13 PROCEDURE — 77063 BREAST TOMOSYNTHESIS BI: CPT

## 2025-06-13 PROCEDURE — 76641 ULTRASOUND BREAST COMPLETE: CPT

## 2025-07-31 ENCOUNTER — RESULT REVIEW (OUTPATIENT)
Age: 50
End: 2025-07-31

## 2025-07-31 ENCOUNTER — APPOINTMENT (OUTPATIENT)
Dept: ULTRASOUND IMAGING | Facility: IMAGING CENTER | Age: 50
End: 2025-07-31
Payer: COMMERCIAL

## 2025-07-31 ENCOUNTER — OUTPATIENT (OUTPATIENT)
Dept: OUTPATIENT SERVICES | Facility: HOSPITAL | Age: 50
LOS: 1 days | End: 2025-07-31
Payer: COMMERCIAL

## 2025-07-31 DIAGNOSIS — Z00.8 ENCOUNTER FOR OTHER GENERAL EXAMINATION: ICD-10-CM

## 2025-07-31 PROCEDURE — 76642 ULTRASOUND BREAST LIMITED: CPT | Mod: 26,RT

## 2025-07-31 PROCEDURE — 76642 ULTRASOUND BREAST LIMITED: CPT
